# Patient Record
Sex: FEMALE | Race: WHITE | Employment: OTHER | URBAN - METROPOLITAN AREA
[De-identification: names, ages, dates, MRNs, and addresses within clinical notes are randomized per-mention and may not be internally consistent; named-entity substitution may affect disease eponyms.]

---

## 2017-06-12 ENCOUNTER — OFFICE VISIT (OUTPATIENT)
Dept: OBGYN | Age: 72
End: 2017-06-12

## 2017-06-12 VITALS
WEIGHT: 175 LBS | BODY MASS INDEX: 33.04 KG/M2 | HEIGHT: 61 IN | DIASTOLIC BLOOD PRESSURE: 64 MMHG | SYSTOLIC BLOOD PRESSURE: 122 MMHG

## 2017-06-12 DIAGNOSIS — Z11.51 ENCOUNTER FOR SCREENING FOR HUMAN PAPILLOMAVIRUS (HPV): ICD-10-CM

## 2017-06-12 DIAGNOSIS — Z12.31 SCREENING MAMMOGRAM, ENCOUNTER FOR: ICD-10-CM

## 2017-06-12 DIAGNOSIS — Z78.0 POSTMENOPAUSAL: ICD-10-CM

## 2017-06-12 DIAGNOSIS — Z01.419 WELL WOMAN EXAM WITH ROUTINE GYNECOLOGICAL EXAM: Primary | ICD-10-CM

## 2017-06-12 LAB — PAP SMEAR: NORMAL

## 2017-06-12 PROCEDURE — G0101 CA SCREEN;PELVIC/BREAST EXAM: HCPCS | Performed by: OBSTETRICS & GYNECOLOGY

## 2017-06-12 PROCEDURE — G8427 DOCREV CUR MEDS BY ELIG CLIN: HCPCS | Performed by: OBSTETRICS & GYNECOLOGY

## 2017-06-12 PROCEDURE — G8417 CALC BMI ABV UP PARAM F/U: HCPCS | Performed by: OBSTETRICS & GYNECOLOGY

## 2017-06-12 RX ORDER — ATORVASTATIN CALCIUM 10 MG/1
TABLET, FILM COATED ORAL
Refills: 1 | COMMUNITY
Start: 2017-04-02

## 2017-06-23 DIAGNOSIS — Z11.51 ENCOUNTER FOR SCREENING FOR HUMAN PAPILLOMAVIRUS (HPV): ICD-10-CM

## 2017-06-23 DIAGNOSIS — Z01.419 WELL WOMAN EXAM WITH ROUTINE GYNECOLOGICAL EXAM: ICD-10-CM

## 2017-12-06 ENCOUNTER — HOSPITAL ENCOUNTER (OUTPATIENT)
Dept: WOMENS IMAGING | Age: 72
Discharge: HOME | End: 2017-12-06
Payer: MEDICARE

## 2017-12-06 DIAGNOSIS — Z12.31 SCREENING MAMMOGRAM, ENCOUNTER FOR: ICD-10-CM

## 2017-12-06 DIAGNOSIS — Z78.0 POSTMENOPAUSAL: ICD-10-CM

## 2017-12-06 PROCEDURE — 77080 DXA BONE DENSITY AXIAL: CPT

## 2017-12-06 PROCEDURE — G0202 SCR MAMMO BI INCL CAD: HCPCS

## 2017-12-15 ENCOUNTER — TELEPHONE (OUTPATIENT)
Dept: OBGYN | Age: 72
End: 2017-12-15

## 2018-06-14 ENCOUNTER — OFFICE VISIT (OUTPATIENT)
Dept: OBGYN CLINIC | Age: 73
End: 2018-06-14
Payer: MEDICARE

## 2018-06-14 VITALS — BODY MASS INDEX: 33.23 KG/M2 | WEIGHT: 176 LBS | HEIGHT: 61 IN

## 2018-06-14 DIAGNOSIS — Z11.51 SCREENING FOR HPV (HUMAN PAPILLOMAVIRUS): ICD-10-CM

## 2018-06-14 DIAGNOSIS — Z01.419 WELL WOMAN EXAM WITH ROUTINE GYNECOLOGICAL EXAM: Primary | ICD-10-CM

## 2018-06-14 PROCEDURE — G8417 CALC BMI ABV UP PARAM F/U: HCPCS | Performed by: OBSTETRICS & GYNECOLOGY

## 2018-06-14 PROCEDURE — G8427 DOCREV CUR MEDS BY ELIG CLIN: HCPCS | Performed by: OBSTETRICS & GYNECOLOGY

## 2018-06-14 PROCEDURE — G0101 CA SCREEN;PELVIC/BREAST EXAM: HCPCS | Performed by: OBSTETRICS & GYNECOLOGY

## 2018-06-14 ASSESSMENT — ENCOUNTER SYMPTOMS
ABDOMINAL PAIN: 0
COLOR CHANGE: 0
CONSTIPATION: 0
COUGH: 0
BLOOD IN STOOL: 0
SINUS PRESSURE: 0
WHEEZING: 0
NAUSEA: 0
VOMITING: 0
SHORTNESS OF BREATH: 0

## 2018-06-21 DIAGNOSIS — Z01.419 WELL WOMAN EXAM WITH ROUTINE GYNECOLOGICAL EXAM: ICD-10-CM

## 2018-06-21 DIAGNOSIS — Z11.51 SCREENING FOR HPV (HUMAN PAPILLOMAVIRUS): ICD-10-CM

## 2018-11-26 ENCOUNTER — TELEPHONE (OUTPATIENT)
Dept: OBGYN CLINIC | Age: 73
End: 2018-11-26

## 2018-11-26 DIAGNOSIS — Z12.31 SCREENING MAMMOGRAM, ENCOUNTER FOR: Primary | ICD-10-CM

## 2018-12-07 ENCOUNTER — HOSPITAL ENCOUNTER (OUTPATIENT)
Dept: WOMENS IMAGING | Age: 73
Discharge: HOME | End: 2018-12-09
Payer: MEDICARE

## 2018-12-07 DIAGNOSIS — Z12.31 SCREENING MAMMOGRAM, ENCOUNTER FOR: ICD-10-CM

## 2018-12-07 PROCEDURE — 77067 SCR MAMMO BI INCL CAD: CPT

## 2019-02-12 PROBLEM — M50.21 HERNIATED NUCLEUS PULPOSUS, C3-4: Status: ACTIVE | Noted: 2019-02-12

## 2019-02-12 PROBLEM — G99.2 STENOSIS OF CERVICAL SPINE WITH MYELOPATHY (HCC): Status: ACTIVE | Noted: 2019-02-12

## 2019-02-12 PROBLEM — M48.02 STENOSIS OF CERVICAL SPINE WITH MYELOPATHY (HCC): Status: ACTIVE | Noted: 2019-02-12

## 2019-12-10 ENCOUNTER — TELEPHONE (OUTPATIENT)
Dept: OBGYN CLINIC | Age: 74
End: 2019-12-10

## 2019-12-10 DIAGNOSIS — Z12.31 SCREENING MAMMOGRAM, ENCOUNTER FOR: Primary | ICD-10-CM

## 2019-12-24 ENCOUNTER — HOSPITAL ENCOUNTER (OUTPATIENT)
Dept: WOMENS IMAGING | Age: 74
Discharge: HOME | End: 2019-12-26
Payer: MEDICARE

## 2019-12-24 DIAGNOSIS — Z12.31 SCREENING MAMMOGRAM, ENCOUNTER FOR: ICD-10-CM

## 2019-12-24 PROCEDURE — 77063 BREAST TOMOSYNTHESIS BI: CPT

## 2020-06-15 ENCOUNTER — OFFICE VISIT (OUTPATIENT)
Dept: OBGYN CLINIC | Age: 75
End: 2020-06-15
Payer: MEDICARE

## 2020-06-15 VITALS — SYSTOLIC BLOOD PRESSURE: 124 MMHG | BODY MASS INDEX: 32.74 KG/M2 | DIASTOLIC BLOOD PRESSURE: 72 MMHG | WEIGHT: 175 LBS

## 2020-06-15 PROCEDURE — G0101 CA SCREEN;PELVIC/BREAST EXAM: HCPCS | Performed by: OBSTETRICS & GYNECOLOGY

## 2020-06-15 RX ORDER — OXYBUTYNIN CHLORIDE 5 MG/1
TABLET, EXTENDED RELEASE ORAL
COMMUNITY
Start: 2020-05-19 | End: 2022-02-01

## 2020-06-15 ASSESSMENT — PATIENT HEALTH QUESTIONNAIRE - PHQ9
SUM OF ALL RESPONSES TO PHQ QUESTIONS 1-9: 0
SUM OF ALL RESPONSES TO PHQ QUESTIONS 1-9: 0
SUM OF ALL RESPONSES TO PHQ9 QUESTIONS 1 & 2: 0
1. LITTLE INTEREST OR PLEASURE IN DOING THINGS: 0
2. FEELING DOWN, DEPRESSED OR HOPELESS: 0

## 2020-06-15 ASSESSMENT — ENCOUNTER SYMPTOMS
VOMITING: 0
ABDOMINAL PAIN: 0
EYES NEGATIVE: 1
RESPIRATORY NEGATIVE: 1
ABDOMINAL DISTENTION: 0
RECTAL PAIN: 0
CONSTIPATION: 0
BLOOD IN STOOL: 0
NAUSEA: 0
DIARRHEA: 0
ALLERGIC/IMMUNOLOGIC NEGATIVE: 1
ANAL BLEEDING: 0

## 2020-06-15 NOTE — PROGRESS NOTES
Effort: Pulmonary effort is normal. No respiratory distress. Breath sounds: Normal breath sounds. No wheezing or rales. Chest:      Chest wall: No tenderness. Breasts:         Right: No mass, nipple discharge, skin change or tenderness. Left: No mass, nipple discharge, skin change or tenderness. Abdominal:      General: Bowel sounds are normal. There is no distension. Palpations: Abdomen is soft. There is no mass. Tenderness: There is no abdominal tenderness. There is no guarding or rebound. Hernia: There is no hernia in the right inguinal area or left inguinal area. Genitourinary:     Labia:         Right: No rash, tenderness, lesion or injury. Left: No rash, tenderness, lesion or injury. Vagina: Normal. No signs of injury and foreign body. No vaginal discharge, erythema, tenderness or bleeding. Cervix: No cervical motion tenderness, discharge or friability. Uterus: Not deviated, not enlarged, not fixed and not tender. Adnexa:         Right: No mass, tenderness or fullness. Left: No mass, tenderness or fullness. Rectum: Normal.   Musculoskeletal: Normal range of motion. General: No tenderness. Lymphadenopathy:      Cervical: No cervical adenopathy. Skin:     General: Skin is warm and dry. Coloration: Skin is not pale. Findings: No erythema or rash. Neurological:      Mental Status: She is alert and oriented to person, place, and time. Psychiatric:         Behavior: Behavior normal.         Thought Content: Thought content normal.         Judgment: Judgment normal.         Assessment:      Diagnosis Orders   1. Encounter for well woman exam with routine gynecological exam     2. Screening mammogram, encounter for  JUDE DIGITAL SCREEN W CAD BILATERAL   3. Osteoporosis screening  DEXA BONE DENSITY AXIAL SKELETON   4.  Postmenopausal  DEXA BONE DENSITY AXIAL SKELETON         Plan:      Medications placedthis

## 2021-01-13 ENCOUNTER — HOSPITAL ENCOUNTER (OUTPATIENT)
Dept: WOMENS IMAGING | Age: 76
Discharge: HOME | End: 2021-01-15
Payer: MEDICARE

## 2021-01-13 DIAGNOSIS — Z13.820 OSTEOPOROSIS SCREENING: ICD-10-CM

## 2021-01-13 DIAGNOSIS — Z12.31 SCREENING MAMMOGRAM, ENCOUNTER FOR: ICD-10-CM

## 2021-01-13 DIAGNOSIS — Z78.0 POSTMENOPAUSAL: ICD-10-CM

## 2021-01-13 PROCEDURE — 77080 DXA BONE DENSITY AXIAL: CPT

## 2021-01-13 PROCEDURE — 77067 SCR MAMMO BI INCL CAD: CPT

## 2022-01-03 ENCOUNTER — TELEPHONE (OUTPATIENT)
Dept: OBGYN CLINIC | Age: 77
End: 2022-01-03

## 2022-01-03 NOTE — TELEPHONE ENCOUNTER
Pt calling to get an order for her mammogram. Pt was last seen June 2020. She is scheduled for her annual on 2-1-22.  Please advise if annual is needed and if mammogram order can be placed before her annual.

## 2022-01-04 NOTE — TELEPHONE ENCOUNTER
Called pt and made aware to keep upcoming scheduled appointment and order to be placed at that time.

## 2022-02-01 ENCOUNTER — OFFICE VISIT (OUTPATIENT)
Dept: OBGYN CLINIC | Age: 77
End: 2022-02-01
Payer: MEDICARE

## 2022-02-01 VITALS
HEIGHT: 62 IN | WEIGHT: 184 LBS | SYSTOLIC BLOOD PRESSURE: 128 MMHG | DIASTOLIC BLOOD PRESSURE: 74 MMHG | BODY MASS INDEX: 33.86 KG/M2

## 2022-02-01 DIAGNOSIS — Z01.419 ENCOUNTER FOR WELL WOMAN EXAM WITH ROUTINE GYNECOLOGICAL EXAM: Primary | ICD-10-CM

## 2022-02-01 DIAGNOSIS — Z12.31 SCREENING MAMMOGRAM FOR BREAST CANCER: ICD-10-CM

## 2022-02-01 PROCEDURE — G8484 FLU IMMUNIZE NO ADMIN: HCPCS | Performed by: OBSTETRICS & GYNECOLOGY

## 2022-02-01 PROCEDURE — G0101 CA SCREEN;PELVIC/BREAST EXAM: HCPCS | Performed by: OBSTETRICS & GYNECOLOGY

## 2022-02-01 ASSESSMENT — ENCOUNTER SYMPTOMS
NAUSEA: 0
RESPIRATORY NEGATIVE: 1
EYES NEGATIVE: 1
BLOOD IN STOOL: 0
DIARRHEA: 0
ABDOMINAL PAIN: 0
CONSTIPATION: 0
VOMITING: 0
ALLERGIC/IMMUNOLOGIC NEGATIVE: 1
RECTAL PAIN: 0
ANAL BLEEDING: 0
ABDOMINAL DISTENTION: 0

## 2022-02-01 ASSESSMENT — PATIENT HEALTH QUESTIONNAIRE - PHQ9
SUM OF ALL RESPONSES TO PHQ QUESTIONS 1-9: 0
2. FEELING DOWN, DEPRESSED OR HOPELESS: 0
SUM OF ALL RESPONSES TO PHQ QUESTIONS 1-9: 0
SUM OF ALL RESPONSES TO PHQ9 QUESTIONS 1 & 2: 0
1. LITTLE INTEREST OR PLEASURE IN DOING THINGS: 0

## 2022-02-01 ASSESSMENT — VISUAL ACUITY: OU: 1

## 2022-02-01 NOTE — PROGRESS NOTES
Subjective:      Patient ID: Jose A Brito is a 78 y.o. female    Annual exam.  No PMB. No GYN complaints. Pap deferred. Screening mammogram ordered. Monthly SBE encouraged. Dexa scan ordered per protocol. Screening colonoscopy recommended per routine  . F/U annual exam or prn. Vitals:  /74   Ht 5' 1.5\" (1.562 m)   Wt 184 lb (83.5 kg)   LMP  (LMP Unknown)   BMI 34.20 kg/m²   Past Medical History:   Diagnosis Date    Chronic back pain      Past Surgical History:   Procedure Laterality Date    BACK SURGERY      x2    CARPAL TUNNEL RELEASE      B/L wrist    CYST REMOVAL      back    LOBECTOMY, THYROID Right      Allergies:  Patient has no known allergies. Current Outpatient Medications   Medication Sig Dispense Refill    atorvastatin (LIPITOR) 10 MG tablet TAKE 1 TABLET AT BEDTIME  1     No current facility-administered medications for this visit. Social History     Socioeconomic History    Marital status: Single     Spouse name: Not on file    Number of children: Not on file    Years of education: Not on file    Highest education level: Not on file   Occupational History    Not on file   Tobacco Use    Smoking status: Former Smoker     Quit date: 2019     Years since quittin.9    Smokeless tobacco: Never Used   Substance and Sexual Activity    Alcohol use: Yes     Comment: occ    Drug use: No    Sexual activity: Not Currently   Other Topics Concern    Not on file   Social History Narrative    Not on file     Social Determinants of Health     Financial Resource Strain:     Difficulty of Paying Living Expenses: Not on file   Food Insecurity:     Worried About Running Out of Food in the Last Year: Not on file    Emily of Food in the Last Year: Not on file   Transportation Needs:     Lack of Transportation (Medical): Not on file    Lack of Transportation (Non-Medical):  Not on file   Physical Activity:     Days of Exercise per Week: Not on file    Minutes of Exercise per Session: Not on file   Stress:     Feeling of Stress : Not on file   Social Connections:     Frequency of Communication with Friends and Family: Not on file    Frequency of Social Gatherings with Friends and Family: Not on file    Attends Yarsani Services: Not on file    Active Member of Clubs or Organizations: Not on file    Attends Club or Organization Meetings: Not on file    Marital Status: Not on file   Intimate Partner Violence:     Fear of Current or Ex-Partner: Not on file    Emotionally Abused: Not on file    Physically Abused: Not on file    Sexually Abused: Not on file   Housing Stability:     Unable to Pay for Housing in the Last Year: Not on file    Number of [place] in the Last Year: Not on file    Unstable Housing in the Last Year: Not on file     Family History   Problem Relation Age of Onset    High Blood Pressure Mother     Stroke Mother     Breast Cancer Neg Hx     Cancer Neg Hx     Diabetes Neg Hx     Eclampsia Neg Hx     Colon Cancer Neg Hx     Hypertension Neg Hx     Ovarian Cancer Neg Hx      Labor Neg Hx     Spont Abortions Neg Hx        Review of Systems   Constitutional: Negative. Negative for activity change, appetite change, chills, diaphoresis, fatigue, fever and unexpected weight change. HENT: Negative. Eyes: Negative. Respiratory: Negative. Cardiovascular: Negative. Gastrointestinal: Negative for abdominal distention, abdominal pain, anal bleeding, blood in stool, constipation, diarrhea, nausea, rectal pain and vomiting. Endocrine: Negative. Genitourinary: Negative for decreased urine volume, difficulty urinating, dyspareunia, dysuria, enuresis, flank pain, frequency, genital sores, hematuria, menstrual problem, pelvic pain, urgency, vaginal bleeding, vaginal discharge and vaginal pain. Musculoskeletal: Negative. Skin: Negative. Allergic/Immunologic: Negative. Neurological: Negative.     Hematological: Negative. Psychiatric/Behavioral: Negative. Objective:     Physical Exam  Constitutional:       Appearance: She is well-developed. HENT:      Head: Normocephalic. Eyes:      General: Lids are normal. Vision grossly intact. Neck:      Thyroid: No thyromegaly. Cardiovascular:      Rate and Rhythm: Normal rate and regular rhythm. Heart sounds: Normal heart sounds. Pulmonary:      Effort: Pulmonary effort is normal. No respiratory distress. Breath sounds: Normal breath sounds. No wheezing or rales. Chest:      Chest wall: No tenderness. Breasts:      Right: Normal. No swelling, bleeding, inverted nipple, mass, nipple discharge, skin change, tenderness, axillary adenopathy or supraclavicular adenopathy. Left: Normal. No swelling, bleeding, inverted nipple, mass, nipple discharge, skin change, tenderness, axillary adenopathy or supraclavicular adenopathy. Abdominal:      General: There is no distension. Palpations: Abdomen is soft. There is no mass. Tenderness: There is no abdominal tenderness. There is no guarding or rebound. Hernia: No hernia is present. There is no hernia in the left inguinal area or right inguinal area. Genitourinary:     General: Normal vulva. Pubic Area: No rash. Labia:         Right: No rash, tenderness, lesion or injury. Left: No rash, tenderness, lesion or injury. Urethra: No prolapse, urethral swelling or urethral lesion. Vagina: Normal. No signs of injury and foreign body. No vaginal discharge, erythema, tenderness or bleeding. Cervix: No cervical motion tenderness, discharge or friability. Uterus: Not deviated, not enlarged, not fixed and not tender. Adnexa:         Right: No mass, tenderness or fullness. Left: No mass, tenderness or fullness. Rectum: Normal.   Musculoskeletal:         General: No tenderness. Normal range of motion.       Cervical back: Normal range of motion and neck supple. Lymphadenopathy:      Cervical: No cervical adenopathy. Upper Body:      Right upper body: No supraclavicular or axillary adenopathy. Left upper body: No supraclavicular or axillary adenopathy. Lower Body: No right inguinal adenopathy. No left inguinal adenopathy. Skin:     General: Skin is warm and dry. Coloration: Skin is not pale. Findings: No erythema or rash. Neurological:      Mental Status: She is alert and oriented to person, place, and time. Psychiatric:         Behavior: Behavior normal.         Thought Content: Thought content normal.         Judgment: Judgment normal.         Assessment:      Diagnosis Orders   1. Encounter for well woman exam with routine gynecological exam     2. Screening mammogram for breast cancer  JUDE DIGITAL SCREEN W OR WO CAD BILATERAL         Plan:      Medications placedthis encounter:  No orders of the defined types were placed in this encounter. Orders placedthis encounter:  Orders Placed This Encounter   Procedures    JUDE DIGITAL SCREEN W OR WO CAD BILATERAL     Standing Status:   Future     Standing Expiration Date:   2/1/2023         Follow up:  Return for Annual, PRN. Repeat Annual GYN exam every 1 year. Cervical Cytology exam starts age 24. If Negative Cytology;  follow-up screening per current guidelines. Mammograms yearly starting at age 36. Calcium and Vitamin D dosing reviewed ( age appropriate ). Colonoscopy and bone density screening discussed ( age appropriate ). Birth control and STD prevention discussed ( age appropriate ). Gardisil counseling completed for all patients ( age appropriate ). Routine health maintenance ( per PCP and guidelines ).

## 2022-02-14 ENCOUNTER — HOSPITAL ENCOUNTER (OUTPATIENT)
Dept: WOMENS IMAGING | Age: 77
Discharge: HOME | End: 2022-02-16
Payer: MEDICARE

## 2022-02-14 VITALS — BODY MASS INDEX: 34.2 KG/M2 | HEIGHT: 62 IN

## 2022-02-14 DIAGNOSIS — Z12.31 SCREENING MAMMOGRAM FOR BREAST CANCER: ICD-10-CM

## 2022-02-14 PROCEDURE — 77063 BREAST TOMOSYNTHESIS BI: CPT

## 2023-02-17 ENCOUNTER — HOSPITAL ENCOUNTER (OUTPATIENT)
Dept: WOMENS IMAGING | Age: 78
End: 2023-02-17
Payer: MEDICARE

## 2023-02-17 VITALS — WEIGHT: 182 LBS | HEIGHT: 62 IN | BODY MASS INDEX: 33.49 KG/M2

## 2023-02-17 DIAGNOSIS — Z78.0 MENOPAUSE: ICD-10-CM

## 2023-02-17 DIAGNOSIS — Z12.31 ENCOUNTER FOR MAMMOGRAM TO ESTABLISH BASELINE MAMMOGRAM: ICD-10-CM

## 2023-02-17 DIAGNOSIS — Z78.0 POST-MENOPAUSAL: ICD-10-CM

## 2023-02-17 PROCEDURE — 77067 SCR MAMMO BI INCL CAD: CPT

## 2023-02-17 PROCEDURE — 77080 DXA BONE DENSITY AXIAL: CPT

## 2023-03-22 LAB
THYROTROPIN (MIU/L) IN SER/PLAS BY DETECTION LIMIT <= 0.05 MIU/L: 2.48 MIU/L (ref 0.44–3.98)
THYROXINE (T4) FREE (NG/DL) IN SER/PLAS: 1.27 NG/DL (ref 0.61–1.12)

## 2023-07-26 ENCOUNTER — HOSPITAL ENCOUNTER (OUTPATIENT)
Dept: DATA CONVERSION | Facility: HOSPITAL | Age: 78
End: 2023-07-27
Attending: ORTHOPAEDIC SURGERY | Admitting: ORTHOPAEDIC SURGERY
Payer: MEDICARE

## 2023-07-26 DIAGNOSIS — M25.761 OSTEOPHYTE, RIGHT KNEE: ICD-10-CM

## 2023-07-26 DIAGNOSIS — Z87.891 PERSONAL HISTORY OF NICOTINE DEPENDENCE: ICD-10-CM

## 2023-07-26 DIAGNOSIS — E66.9 OBESITY, UNSPECIFIED: ICD-10-CM

## 2023-07-26 DIAGNOSIS — D62 ACUTE POSTHEMORRHAGIC ANEMIA: ICD-10-CM

## 2023-07-26 DIAGNOSIS — M17.11 UNILATERAL PRIMARY OSTEOARTHRITIS, RIGHT KNEE: ICD-10-CM

## 2023-07-26 LAB
ABO GROUP (TYPE) IN BLOOD: NORMAL
ABO GROUP (TYPE) IN BLOOD: NORMAL
ANTIBODY SCREEN: NORMAL
RH FACTOR: NORMAL
RH FACTOR: NORMAL

## 2023-07-27 LAB
ANION GAP IN SER/PLAS: 12 MMOL/L (ref 10–20)
ANION GAP SERPL CALCULATED.3IONS-SCNC: 12 MMOL/L (ref 10–20)
BASOPHILS # BLD: 0.01 X10E9/L (ref 0–0.1)
BASOPHILS (10*3/UL) IN BLOOD BY AUTOMATED COUNT: 0.01 X10E9/L (ref 0–0.1)
BASOPHILS RELATIVE PERCENT: 0.1 % (ref 0–2)
BASOPHILS/100 LEUKOCYTES IN BLOOD BY AUTOMATED COUNT: 0.1 % (ref 0–2)
BICARBONATE: 24 MMOL/L (ref 21–32)
CALCIUM (MG/DL) IN SER/PLAS: 8.2 MG/DL (ref 8.6–10.3)
CALCIUM SERPL-MCNC: 8.2 MG/DL (ref 8.6–10.3)
CARBON DIOXIDE, TOTAL (MMOL/L) IN SER/PLAS: 24 MMOL/L (ref 21–32)
CHLORIDE (MMOL/L) IN SER/PLAS: 100 MMOL/L (ref 98–107)
CHLORIDE BLD-SCNC: 100 MMOL/L (ref 98–107)
CREAT SERPL-MCNC: 1.05 MG/DL (ref 0.5–1.05)
CREATININE (MG/DL) IN SER/PLAS: 1.05 MG/DL (ref 0.5–1.05)
EGFR FEMALE: 55 ML/MIN/1.73M2
ERYTHROCYTE DISTRIBUTION WIDTH (RATIO) BY AUTOMATED COUNT: 13 % (ref 11.5–14.5)
ERYTHROCYTE MEAN CORPUSCULAR HEMOGLOBIN CONCENTRATION (G/DL) BY AUTOMATED: 33.6 G/DL (ref 32–36)
ERYTHROCYTE MEAN CORPUSCULAR VOLUME (FL) BY AUTOMATED COUNT: 89 FL (ref 80–100)
ERYTHROCYTE [DISTWIDTH] IN BLOOD BY AUTOMATED COUNT: 13 % (ref 11.5–14.5)
ERYTHROCYTES (10*6/UL) IN BLOOD BY AUTOMATED COUNT: 2.98 X10E12/L (ref 4–5.2)
GFR FEMALE: 55 ML/MIN/1.73M2
GLUCOSE (MG/DL) IN SER/PLAS: 146 MG/DL (ref 74–99)
GLUCOSE: 146 MG/DL (ref 74–99)
HCT VFR BLD CALC: 26.5 % (ref 36–46)
HEMATOCRIT (%) IN BLOOD BY AUTOMATED COUNT: 26.5 % (ref 36–46)
HEMOGLOBIN (G/DL) IN BLOOD: 8.9 G/DL (ref 12–16)
HEMOGLOBIN: 8.9 G/DL (ref 12–16)
IMMATURE GRANULOCYTES %: 0.4 % (ref 0–0.9)
IMMATURE GRANULOCYTES/100 LEUKOCYTES IN BLOOD BY AUTOMATED COUNT: 0.4 % (ref 0–0.9)
LEUKOCYTES (10*3/UL) IN BLOOD BY AUTOMATED COUNT: 9.8 X10E9/L (ref 4.4–11.3)
LYMPHOCYTES # BLD: 8.5 % (ref 13–44)
LYMPHOCYTES (10*3/UL) IN BLOOD BY AUTOMATED COUNT: 0.83 X10E9/L (ref 0.8–3)
LYMPHOCYTES RELATIVE PERCENT: 0.83 X10E9/L (ref 0.8–3)
LYMPHOCYTES/100 LEUKOCYTES IN BLOOD BY AUTOMATED COUNT: 8.5 % (ref 13–44)
MCHC RBC AUTO-ENTMCNC: 33.6 G/DL (ref 32–36)
MCV RBC AUTO: 89 FL (ref 80–100)
MONOCYTES # BLD: 0.53 X10E9/L (ref 0.05–0.8)
MONOCYTES (10*3/UL) IN BLOOD BY AUTOMATED COUNT: 0.53 X10E9/L (ref 0.05–0.8)
MONOCYTES RELATIVE PERCENT: 5.4 % (ref 2–10)
MONOCYTES/100 LEUKOCYTES IN BLOOD BY AUTOMATED COUNT: 5.4 % (ref 2–10)
NEUTROPHILS (10*3/UL) IN BLOOD BY AUTOMATED COUNT: 8.36 X10E9/L (ref 1.6–5.5)
NEUTROPHILS RELATIVE PERCENT: 85.6 % (ref 40–80)
NEUTROPHILS/100 LEUKOCYTES IN BLOOD BY AUTOMATED COUNT: 85.6 % (ref 40–80)
NEUTROPHILS: 8.36 X10E9/L (ref 1.6–5.5)
PLATELET # BLD: 221 X10E9/L (ref 150–450)
PLATELETS (10*3/UL) IN BLOOD AUTOMATED COUNT: 221 X10E9/L (ref 150–450)
POTASSIUM (MMOL/L) IN SER/PLAS: 4.1 MMOL/L (ref 3.5–5.3)
POTASSIUM SERPL-SCNC: 4.1 MMOL/L (ref 3.5–5.3)
RBC # BLD: 2.98 X10E12/L (ref 4–5.2)
SODIUM (MMOL/L) IN SER/PLAS: 132 MMOL/L (ref 136–145)
SODIUM BLD-SCNC: 132 MMOL/L (ref 136–145)
UREA NITROGEN (MG/DL) IN SER/PLAS: 21 MG/DL (ref 6–23)
UREA NITROGEN: 21 MG/DL (ref 6–23)
WBC: 9.8 X10E9/L (ref 4.4–11.3)

## 2023-07-28 LAB
ANION GAP IN SER/PLAS: NORMAL
BASOPHILS (10*3/UL) IN BLOOD BY AUTOMATED COUNT: NORMAL
BASOPHILS/100 LEUKOCYTES IN BLOOD BY AUTOMATED COUNT: NORMAL
CALCIUM (MG/DL) IN SER/PLAS: NORMAL
CARBON DIOXIDE, TOTAL (MMOL/L) IN SER/PLAS: NORMAL
CHLORIDE (MMOL/L) IN SER/PLAS: NORMAL
CREATININE (MG/DL) IN SER/PLAS: NORMAL
EOSINOPHILS (10*3/UL) IN BLOOD BY AUTOMATED COUNT: NORMAL
EOSINOPHILS/100 LEUKOCYTES IN BLOOD BY AUTOMATED COUNT: NORMAL
ERYTHROCYTE DISTRIBUTION WIDTH (RATIO) BY AUTOMATED COUNT: NORMAL
ERYTHROCYTE MEAN CORPUSCULAR HEMOGLOBIN CONCENTRATION (G/DL) BY AUTOMATED: NORMAL
ERYTHROCYTE MEAN CORPUSCULAR VOLUME (FL) BY AUTOMATED COUNT: NORMAL
ERYTHROCYTES (10*6/UL) IN BLOOD BY AUTOMATED COUNT: NORMAL
GFR FEMALE: NORMAL
GFR MALE: NORMAL
GLUCOSE (MG/DL) IN SER/PLAS: NORMAL
HEMATOCRIT (%) IN BLOOD BY AUTOMATED COUNT: NORMAL
HEMOGLOBIN (G/DL) IN BLOOD: NORMAL
IMMATURE GRANULOCYTES/100 LEUKOCYTES IN BLOOD BY AUTOMATED COUNT: NORMAL
LEUKOCYTES (10*3/UL) IN BLOOD BY AUTOMATED COUNT: NORMAL
LYMPHOCYTES (10*3/UL) IN BLOOD BY AUTOMATED COUNT: NORMAL
LYMPHOCYTES/100 LEUKOCYTES IN BLOOD BY AUTOMATED COUNT: NORMAL
MANUAL DIFFERENTIAL Y/N: NORMAL
MONOCYTES (10*3/UL) IN BLOOD BY AUTOMATED COUNT: NORMAL
MONOCYTES/100 LEUKOCYTES IN BLOOD BY AUTOMATED COUNT: NORMAL
NEUTROPHILS (10*3/UL) IN BLOOD BY AUTOMATED COUNT: NORMAL
NEUTROPHILS/100 LEUKOCYTES IN BLOOD BY AUTOMATED COUNT: NORMAL
NRBC (PER 100 WBCS) BY AUTOMATED COUNT: NORMAL
PLATELETS (10*3/UL) IN BLOOD AUTOMATED COUNT: NORMAL
POTASSIUM (MMOL/L) IN SER/PLAS: NORMAL
SODIUM (MMOL/L) IN SER/PLAS: NORMAL
UREA NITROGEN (MG/DL) IN SER/PLAS: NORMAL

## 2023-09-30 NOTE — DISCHARGE SUMMARY
Send Summary:   Discharge Summary Providers:  Provider Role Provider Name   · Referring Nadira Contreras   · Attending David Ramon   · Consulting Jerson Pate   · Primary Nadira Contreras       Note Recipients: Jerson Pate MD Vicario, Deborah, MD - [phone] [preferred]       Discharge:    Summary:   Admission Date: .26-Jul-2023 09:26:00   Discharge Date: 27-Jul-2023   Attending Physician at Discharge: David Ramon   Admission Reason: s/p total knee   Final Discharge Diagnoses: Status post total knee  replacement   Procedures: Date: 26-Jul-2023 14:49:00  Procedure Name: 1.  Right knee robotically assisted total knee replacement  2.   3.   4.   5.   Condition at Discharge: Satisfactory   Disposition at Discharge: .Home   Vital Signs:        T   P  R  BP   MAP  SpO2   Value  36.8  55  16  140/65   93  93%  Date/Time 7/27 7:44 7/27 7:44 7/27 7:44 7/27 7:44  7/27 7:44 7/27 7:44  Range  (36.1C - 36.8C )  (55 - 71 )  (16 - 16 )  (127 - 140 )/ (61 - 65 )  (90 - 93 )  (93% - 94% )    Date:            Weight/Scale Type:  Height:   26-Jul-2023 09:45  80.4  kg / standing       Hospital Course:                                                  Discharge summary  This patient  was admitted to the hospital on 7/26/23 after undergoing tka without complications that morning.  During the postoperative period, while in hospital, patient was medically managed by the hospitalist. Please see medial notes and H&P for patients additional diagnoses.  Ortho agrees with all medical diagnoses and treatments while patient in hospital.  No significant or unexpected findings or abnormal ortho imaging were noted during the hospital stay  Hospital course  Patient tolerated surgical procedure well and there was no complications. Patient progressed adequately through their recovery during hospital stay including PT and rehabilitation.  DVT prophylaxis was implemented POD#1  Patient was then D/C to home} in stable condition.    Patient was  instructed on the use of pain medications, the signs and symptoms of infection, and was given our number to call should they have any questions or concerns following discharge.          Discharge Information:    and Continuing Care:   Lab Results - Pending:    None  Radiology Results - Pending: None   Discharge Instructions:    Activity:           activity as tolerated.          May shower..            May not return to school/work.            May not drive.            Weight-bearing Instructions: full weight bearing.  per total knee    Nutrition/Diet:           resume normal diet    Wound Care:           Wound Site:   right knee          Change Dressin time   peel off rubber dressing on 23    Additional Orders:           Additional Instructions:                                                        Total Knee Replacement                                                                                                    Discharge Instructions    To prevent Clot formation, you have been placed on the following medication:    anticoagulation                                              Surgical Site Care:    Change dressing once a day and PRN (as needed).     Apply 4 x 4 sponge and light tape.     If glue present, leave open to air.    You may leave wound open to air after initial dressing removal, if wound is clean, dry and intact     Aquacel Ag dressing is present, do not remove dressing for 7 days, unless heavily saturated.    If heavily saturated, remove dressing and start using instructions above     Staples will be removed on post-operative day 14 and steri-strips applied Showering is permitted starting post op day 1 if waterproof Aquacel dressing is present or when the incision is covered with 4 x 4 and Tegaderm waterproof dressing     Until all areas of incision are healed.                                                 Physical Therapy:        Weight Bearing Status:  Weight bear as  tolerated     total knee- protocol                                                                                                                            Pain Medications        You were given narcotics     Wean off pain medications as you deem appropriate as long as pain is under control Cold packs/Ice packs/Machine May be used every hour for 15-30 minutes as tolerated Be sure to have a barrier (cloth, clothing, towel) between the site an the ice pack to  prevent frostbite.    incentive spirometer 10 x every hour while awake for 2 weeks     surgical teds x3 weeks- removing only for skin care and hygiene     IF INCISION STARTS TO DRAIN CALL OFFICE RIGHT A WAY    Contact [facility] office if    Increased redness, swelling, drainage of any kind, and/or pain to surgery site. As well as new onset fevers and or chills. These could signify an infection. Calf or thigh tenderness to touch as well as increased swelling or redness. This could signify  a clot formation. Numbness or tingling to an area around the incision site or below the incision site (toes).Any rash appears, increased or new onset nausea/vomiting occur. This may indicate a reaction to a medication.    Phone # [phone].                                    Follow up with Surgeon in 2 weeks or as scheduled by the office     Home Care Certification:           Home Care Agency:    [facility] [phone]          Skilled Disciplines Ordered:   PT,  OT    Home Care Services:           Home Care Skilled Service:   Rehab (PT/OT/SP eval and treat),  wound care    Follow Up Appointments:    Follow-Up Appointment 01:           Physician/Dept/Service:   Dr JOSÉ MIGUEL Ramon          Reason for Referral:   right knee- post op          Call to Schedule in:   2 weeks,  or as already scheduled by the office          Location:   [address] [facility]          Phone Number:   [phone]    Discharge Medications: Home Medication    atorvastatin 10 mg oral tablet - 1 tab(s) orally once a day (at bedtime)  levothyroxine 50 mcg (0.05 mg) oral tablet - 1 tab(s) orally once a day  multivitamin - 1 tab(s) orally once a day  oxyCODONE 5 mg oral tablet - 1 tab(s) orally every 4 hours, As needed, Pain - 7-10    and take 0.5 tabs (2.5mg) for pain 4-6  aspirin 81 mg oral delayed release tablet - 1 tab(s) orally 2 times a day  docusate sodium 100 mg oral capsule - 1 cap(s) orally 2 times a day  pantoprazole 40 mg oral delayed release tablet - 1 tab(s) orally once a day     PRN Medication   cyclobenzaprine 10 mg oral tablet - 1 tab(s) orally 3 times a day, As needed, Muscle Spasms. if a whole pill makes you too tired- please try half  Aleve 220 mg oral tablet - 1 tab(s) orally every 12 hours, As Needed- holding for procedure   Claritin 10 mg oral tablet - 1 tab(s) orally once a day, As Needed     DNR Status:   ·  Code Status Code Status order at time of discharge: Full Code       Electronic Signatures:  Dotty Bermudez (APRN-CNP)  (Signed 27-Jul-2023 10:26)   Authored: Send Summary, Summary Content, Ongoing Care,  DNR Status, Note Completion      Last Updated: 27-Jul-2023 10:26 by Dotty Bermudez (APRN-CNP)

## 2023-09-30 NOTE — PROGRESS NOTES
Service: Orthopaedics     Subjective Data:   CHRISTOPHER ESCOTO is a 78 year old Female who is Hospital Day # 2 and POD #1 for 1.  Right knee robotically assisted total knee replacement;2. ;3. ;4. ;5.    Overnight Events: Patient had an uneventful night.     Objective Data:     Objective Information:      T   P  R  BP   MAP  SpO2   Value  36.8  55  16  140/65   93  93%  Date/Time 7/27 7:44 7/27 7:44 7/27 7:44 7/27 7:44 7/27 7:44 7/27 7:44  Range  (36.1C - 36.8C )  (55 - 71 )  (16 - 16 )  (127 - 140 )/ (61 - 65 )  (90 - 93 )  (93% - 94% )      Pain reported at 7/27 8:16: 5 = Moderate      T   P  R  BP   MAP  SpO2   Value  36.8  55  16  140/65   93  93%  Date/Time 7/27 7:44 7/27 7:44 7/27 7:44 7/27 7:44  7/27 7:44 7/27 7:44  Range  (36.1C - 36.8C )  (55 - 71 )  (16 - 16 )  (127 - 140 )/ (61 - 65 )  (90 - 93 )  (93% - 94% )        Pain reported at 7/27 8:16: 5 = Moderate         Weights   7/26 9:45: Weight in kg (Weight (kg))  80.4  7/26 9:45: Weight in lbs ((lbs))  177.2    Medication:    Medications:          Continuous Medications       --------------------------------    1. Lactated Ringers Infusion:  1000  mL  IntraVenous  <Continuous>    2. Lactated Ringers Infusion:  1000  mL  IntraVenous  <Continuous>         Scheduled Medications       --------------------------------    1. Acetaminophen:  650  mg  Oral  Every 6 Hours    2. Aspirin Enteric Coated:  81  mg  Oral  2 Times a Day    3. Atorvastatin:  10  mg  Oral  Every Night    4. Docusate:  100  mg  Oral  2 Times a Day    5. Ketorolac Injectable:  7.5  mg  IntraVenous Push  Every 6 Hours    6. Levothyroxine:  50  microgram(s)  Oral  Daily    7. Pantoprazole:  40  mg  Oral  Daily    8. Polyethylene Glycol:  17  gram(s)  Oral  2 Times a Day         PRN Medications       --------------------------------    1. Cyclobenzaprine:  10  mg  Oral  3 Times a Day    2. diphenhydrAMINE Injectable:  12.5  mg  IntraVenous Push  Every 6 Hours    3. Magnesium Hydroxide -Al  Hydrox -Simethicone Oral Liquid:  30  mL  Oral  Every 6 Hours    4. Ondansetron Injectable:  4  mg  IntraVenous Push  Every 6 Hours    5. oxyCODONE Immediate Release:  2.5  mg  Oral  Every 4 Hours    6. oxyCODONE Immediate Release:  5  mg  Oral  Every 4 Hours    7. oxyCODONE Immediate Release:  7.5  mg  Oral  Every 4 Hours        Recent Lab Results:    Results:    CBC: 7/27/2023 05:18              \     Hgb     /                              \     8.9 L    /  WBC  ----------------  Plt               9.8       ----------------    221              /     Hct     \                              /     26.5 L    \            RBC: 2.98 L    MCV: 89     Neutrophil %: 85.6      BMP: 7/27/2023 05:18  NA+        Cl-     BUN  /                         132 L   100    21  /  --------------------------------  Glucose                ---------------------------  146 H    K+     HCO3-   Creat \                         4.1  24    1.05  \  Calcium : 8.2 L    Anion Gap : 12        I have reviewed these laboratory results: Complete Blood Count + Differential [Drawn 27-Jul-2023 05:18:00], Basic Metabolic Panel [Drawn 27-Jul-2023 05:18:00].    Assessment and Plan:        Admitting Dx:   Status post total knee replacement: Entered Date: 26-Jul-2023  15:00    Comorbidities:  ·  Comorbidity anemia   ·  Anemia acute blood loss anemia, surgery related as well as partly dilutional     Code Status:  ·  Code Status Full Code     Advance Care Planning:  Advance Care Planning: I evaluated the patient  and determined the patient's capacity to understand the risks, benefits and alternatives to treatment. I elicited the patient's goals for treatment and reviewed advance directives and medical orders for life sustaining treatment. The patient was given  an opportunity to review a blank advance directive as appropriate.     Assessment:    Assessment    pt is doing really good.  able to move around, has been able to ambulate and use the restroom,  pain is well controlled, denies any nausea vomiting, SOB or chest pain. doing. pt's incision is clean dry and intact and area is soft. pt is doing  good and  states he is ready to get going. she is going to stay with her sister for bit. pt does have some acute blood loss anemia- which is also partly dilutional    PLAN   PT/ OT and continued mobility   Home with [facility]   has a walker   DVT prophylaxis    pain meds      Electronic Signatures:  Dotty Bermudez (APRN-CNP)   (Signed 27-Jul-2023 08:48)   Authored: Service, Assessment/Plan Review, Subjective Data, Objective Data, Assessment and Plan, Note Completion  Michel Ferrari)   (Signed 27-Jul-2023 13:46)   Co-Signer: Service, Assessment/Plan Review, Subjective Data, Objective Data, Assessment and Plan, Note Completion    Last Updated: 27-Jul-2023 13:46 by Michel Ferrari)

## 2023-10-02 NOTE — OP NOTE
Post Operative Note:     Post-Procedure Diagnosis: End-stage right knee arthritis   Procedure: 1.  Right knee robotically assisted total  knee replacement  2.   3.   4.   5.   Surgeon: David Ramon MD   Resident/Fellow/Other Assistant: David Soni PA-C   Estimated Blood Loss (mL): 100   Specimen: no   Findings: Grade 4 tricompartmental arthritis     Operative Report Dictated:  Dictation: not applicable - note contains Operative  Report   Note Recipients: Nadira Contreras MD - [id number]  [preferred]   Operative Report:    Implants Ellijay triathlon CS  Femoral component size 4  Tibial component size 3  Tibial insert poly-9 mm    Patella component 29 mm /8 mm poly       Indications: 78-year-old female with persistent knee pain despite conservative treatment elected proceed with surgery for right knee robotically assisted total knee replacement     The patient has pain in the knee that is increased with activity and weightbearing, and walks with an antalgic gait.  The pain is interfering with activities of daily living.  The patient has limited range of motion and pain with passive range of motion.   X-rays demonstrate joint space narrowing, subchondral sclerosis and osteophyte formation.  Symptoms are not improving with medication, therapy or supportive device for a period of at least 3 months. Patient has had progressive knee pain which has been  refractory to conservative treatment.    The patient has decided to undergo elective knee replacement using the SAVANA/robotically assisted knee replacement system.      The risks benefits outcomes and postoperative course were fully explained to the patient.  Risks benefits and alternatives to surgery were discussed including but not limited to Infection, bleeding, neurovascular injury, pain and dysfunction, hardware  related complications including cutout failure breakage, loss of function, motion, and permanent disability as well as the cardiovascular and  pulmonary complications from anesthesia including death and DVT.   Patient and family accept these risks.  We discussed specifically loosening, infection, blood clot, incomplete pain relief and the need for revision arthroplasty, possible intraoperative decision for total knee replacement, loss of life loss of limb, nerve damage, numbness, failure of the procedure,  stiffness, progressive arthritis and the potential need for revision knee surgery.  The patient understands the procedure and its risks and consents to the surgical intervention.     Patient identified in the preop area.  Right lower extremity marked and confirmed with patient as the operative site.  Brought back to the operating room and anesthetized under anesthesia.  Right lower extremity was then prepped and draped in standard  sterile fashion.  Patient, site and procedure were confirmed with timeout.  Everyone in the room agreed.  Preop antibiotics were given. Patient was placed under a spinal as well as a regional nerve block.     The patient was brought to the operating room and placed on the surgical table in supine position where upon adequate anesthesia was then obtained.  Surgical timeout was then performed.  This history and physical has been reviewed and updated in signed  the patient had received appropriate antibiotics and the surgical site was marked prior to entering the operating room.  The leg was prepped and draped in the usual sterile manner.    We then made a direct midline incision through the skin, subcutaneous fat, and peritenon.  We then proceeded with a medial parapatellar approach incising through the medial border the patellar tendon and quadriceps tendon.  We incised the medial meniscus  and removed some of the fat pad for exposure.  We then subluxed the patella and inspected the knee joint.  Cartilage disease pattern: Tricompartmental arthritis grade 4 medial and patellofemoral grade 3 lateral compartment.    Given the  diffuse cartilage disease in all 3 compartments we then proceeded with a total knee replacement.  We then placed our femoral and tibial pins with array.  Placed femoral and tibial divots for our robotically assisted planning and templating.  Once this was complete land marking was performed and accuracy was assessed in both the tibia and the femur.  After confirming accuracy femoral tracking was assessed as was the soft tissue tension and motion usually using the [employer] computer system.  Removed  osteophytes and took our capture poses at 0 and 90 degrees.  We then templated our resection cuts.  Initial gaps were 18 mm mm.  With templating we were 18/18 mm medial and lateral in extension and 19/19 mm medial to lateral with on the femur at 90 degrees  of flexion.  Tibia was cut in neutral  Once we were satisfied with the soft tissue tension, we went ahead and proceeded with cutting of the femoral and tibial surfaces.  Robotically assisted cuts were made according to templates.  Confirming our checkpoints in between for accuracy.  Removed  all of fracture bone pieces, soft tissue, meniscus, and posterior osteophytes.  We injected the posterior capsule.  Bony surfaces were then irrigated with a copious amount of saline irrigation and the trial components were inserted.  With the trial components  in position, confirmed rotation and the polyethylene trial the knee was placed through range of motion and stability was assessed.  Intraoperative resection was consistent with our templated plan.  The femoral trial was flush with no lateral overhang.   PEG drill was used on the femur.  We then proceeded with a resurfacing the patella.  Her original patellar thickness was 21 mm.  We removed all the osteophytes and cut to a 12 mm thickness.  A size 29 mm poly was selected with an overall thickness 20/21  mm.  Drilled and trial was placed. Excellent tracking of the patella with a towel clip.  Tibial component rotation was marked.   We then placed her tourniquet up to 300 mmHg an Esmarch was used.  Tibial polytrial was removed and retractors placed.  We  then punched the tibial keel assuring the tibial tray was flush to the anterior medial and posterior lateral tibia. Once we were satisfied with range of motion and stability the trial components were removed. A all bony surfaces were again thoroughly  irrigated.  And the final components were selected.  We cemented the tibia and placed our final poly component.  Cement was then placed on the femur and final femoral component was malleted into place and set flush.  Cement was then placed on the back  of the patella and our button was secured flush and excess cement was removed.  Range of motion was 125 degrees degrees.  Patient able to easily extend to neutral and was able to extend to 0 degrees.  Was 1 degree of varus throughout the range of motion.   The knee was again irrigated with saline irrigation.  The joint capsule was repaired using # 2 Ethibond suture in a figure-of-eight manner with interrupted sutures.  The knee was then placed in 90 of flexion while the subcutaneous tissues were repaired  using 2-0 Vicryl l suture.  4-0 Monocryl was used for the skin and skin glue was used.  A dry sterile Aquacell  bandage was applied. The patient tolerated the procedure well was taken to the post anesthesia care unit in good condition without complication.     A postoperative x-ray was checked.  The findings of the procedure were discussed with the patient's family at the conclusion of the operation.     David Soni PA-C was present throughout the entire case. Given the nature of the disease process and the procedure, a skilled surgical first assistant was necessary during the case. The assistant was necessary to hold retractors and to manipulate  the extremity during the procedure. A certified scrub tech was at the back table managing the instruments and supplies for the surgical  case.        Electronic Signatures:  David Ramon)  (Signed 26-Jul-2023 14:54)   Authored: Post Operative Note, Note Completion      Last Updated: 26-Jul-2023 14:54 by David Ramon)

## 2023-10-04 DIAGNOSIS — E78.2 HYPERLIPEMIA, MIXED: Primary | ICD-10-CM

## 2023-10-04 RX ORDER — ATORVASTATIN CALCIUM 10 MG/1
10 TABLET, FILM COATED ORAL NIGHTLY
Qty: 90 TABLET | Refills: 1 | Status: SHIPPED | OUTPATIENT
Start: 2023-10-04 | End: 2024-03-28

## 2023-10-18 PROBLEM — E04.2 MULTIPLE THYROID NODULES: Status: ACTIVE | Noted: 2023-10-18

## 2023-10-18 PROBLEM — E66.811 CLASS 1 OBESITY DUE TO EXCESS CALORIES WITH BODY MASS INDEX (BMI) OF 34.0 TO 34.9 IN ADULT: Status: ACTIVE | Noted: 2023-10-18

## 2023-10-18 PROBLEM — E03.9 HYPOTHYROIDISM: Status: ACTIVE | Noted: 2023-10-18

## 2023-10-18 PROBLEM — M54.2 NECK PAIN ON RIGHT SIDE: Status: ACTIVE | Noted: 2023-10-18

## 2023-10-18 PROBLEM — M54.12 CERVICAL RADICULOPATHY, CHRONIC: Status: ACTIVE | Noted: 2023-10-18

## 2023-10-18 PROBLEM — R32 URINARY INCONTINENCE: Status: ACTIVE | Noted: 2023-10-18

## 2023-10-18 PROBLEM — N39.0 UTI (URINARY TRACT INFECTION): Status: ACTIVE | Noted: 2023-10-18

## 2023-10-18 PROBLEM — M48.02 DEGENERATIVE CERVICAL SPINAL STENOSIS: Status: ACTIVE | Noted: 2023-10-18

## 2023-10-18 PROBLEM — G89.29 CHRONIC PAIN OF RIGHT KNEE: Status: ACTIVE | Noted: 2023-10-18

## 2023-10-18 PROBLEM — H69.90 EUSTACHIAN TUBE DISORDER: Status: ACTIVE | Noted: 2023-10-18

## 2023-10-18 PROBLEM — M25.561 CHRONIC PAIN OF RIGHT KNEE: Status: ACTIVE | Noted: 2023-10-18

## 2023-10-18 PROBLEM — G56.00 CARPAL TUNNEL SYNDROME: Status: ACTIVE | Noted: 2023-10-18

## 2023-10-18 PROBLEM — M54.30 SCIATICA: Status: ACTIVE | Noted: 2023-10-18

## 2023-10-18 PROBLEM — R19.5 POSITIVE COLORECTAL CANCER SCREENING USING COLOGUARD TEST: Status: ACTIVE | Noted: 2023-10-18

## 2023-10-18 PROBLEM — M17.10 OSTEOARTHRITIS, LOCALIZED, KNEE: Status: ACTIVE | Noted: 2023-10-18

## 2023-10-18 PROBLEM — M19.049 CMC ARTHRITIS: Status: ACTIVE | Noted: 2023-10-18

## 2023-10-18 PROBLEM — E66.09 CLASS 1 OBESITY DUE TO EXCESS CALORIES WITH BODY MASS INDEX (BMI) OF 34.0 TO 34.9 IN ADULT: Status: ACTIVE | Noted: 2023-10-18

## 2023-10-18 PROBLEM — M70.20 OLECRANON BURSITIS: Status: ACTIVE | Noted: 2023-10-18

## 2023-10-18 PROBLEM — G89.18 POST-OP PAIN: Status: ACTIVE | Noted: 2023-10-18

## 2023-10-18 PROBLEM — R12 HEARTBURN: Status: ACTIVE | Noted: 2023-10-18

## 2023-10-18 PROBLEM — E78.2 MIXED HYPERLIPIDEMIA: Status: ACTIVE | Noted: 2023-10-18

## 2023-10-18 RX ORDER — SULFAMETHOXAZOLE AND TRIMETHOPRIM 800; 160 MG/1; MG/1
1 TABLET ORAL 2 TIMES DAILY
COMMUNITY
Start: 2023-07-12 | End: 2023-10-24 | Stop reason: WASHOUT

## 2023-10-18 RX ORDER — LEVOTHYROXINE SODIUM 50 UG/1
50 TABLET ORAL DAILY
COMMUNITY
Start: 2023-07-10 | End: 2023-10-24 | Stop reason: SDUPTHER

## 2023-10-18 RX ORDER — CALCIUM/MAGNESIUM/ZINC 333-133-5
1 TABLET ORAL DAILY
COMMUNITY

## 2023-10-18 RX ORDER — LORATADINE 10 MG/1
1 CAPSULE, LIQUID FILLED ORAL DAILY
COMMUNITY

## 2023-10-21 PROBLEM — M25.439 WRIST SWELLING: Status: ACTIVE | Noted: 2023-10-21

## 2023-10-21 PROBLEM — Z96.651 S/P TOTAL KNEE REPLACEMENT, RIGHT: Status: ACTIVE | Noted: 2023-10-21

## 2023-10-21 RX ORDER — LEVOTHYROXINE SODIUM 25 UG/1
25 TABLET ORAL DAILY
COMMUNITY
End: 2023-10-24 | Stop reason: DRUGHIGH

## 2023-10-24 ENCOUNTER — OFFICE VISIT (OUTPATIENT)
Dept: PRIMARY CARE | Facility: CLINIC | Age: 78
End: 2023-10-24
Payer: MEDICARE

## 2023-10-24 VITALS
SYSTOLIC BLOOD PRESSURE: 124 MMHG | TEMPERATURE: 97.7 F | DIASTOLIC BLOOD PRESSURE: 64 MMHG | HEART RATE: 71 BPM | BODY MASS INDEX: 31.5 KG/M2 | HEIGHT: 62 IN | OXYGEN SATURATION: 95 % | WEIGHT: 171.16 LBS

## 2023-10-24 DIAGNOSIS — Z00.00 ENCOUNTER FOR PREVENTIVE HEALTH EXAMINATION: ICD-10-CM

## 2023-10-24 DIAGNOSIS — Z76.0 ENCOUNTER FOR MEDICATION REFILL: ICD-10-CM

## 2023-10-24 DIAGNOSIS — E03.9 ACQUIRED HYPOTHYROIDISM: ICD-10-CM

## 2023-10-24 DIAGNOSIS — N39.44 NOCTURNAL ENURESIS: ICD-10-CM

## 2023-10-24 DIAGNOSIS — E66.09 CLASS 1 OBESITY DUE TO EXCESS CALORIES WITH BODY MASS INDEX (BMI) OF 34.0 TO 34.9 IN ADULT, UNSPECIFIED WHETHER SERIOUS COMORBIDITY PRESENT: ICD-10-CM

## 2023-10-24 DIAGNOSIS — R30.0 DYSURIA: ICD-10-CM

## 2023-10-24 DIAGNOSIS — E78.2 MIXED HYPERLIPIDEMIA: ICD-10-CM

## 2023-10-24 DIAGNOSIS — E11.65 TYPE 2 DIABETES MELLITUS WITH HYPERGLYCEMIA, WITHOUT LONG-TERM CURRENT USE OF INSULIN (MULTI): Primary | ICD-10-CM

## 2023-10-24 DIAGNOSIS — Z71.2 ENCOUNTER TO DISCUSS TEST RESULTS: ICD-10-CM

## 2023-10-24 LAB
APPEARANCE UR: ABNORMAL
BACTERIA #/AREA URNS AUTO: ABNORMAL /HPF
BILIRUB UR STRIP.AUTO-MCNC: NEGATIVE MG/DL
COLOR UR: YELLOW
GLUCOSE UR STRIP.AUTO-MCNC: NEGATIVE MG/DL
KETONES UR STRIP.AUTO-MCNC: NEGATIVE MG/DL
LEUKOCYTE ESTERASE UR QL STRIP.AUTO: ABNORMAL
MUCOUS THREADS #/AREA URNS AUTO: ABNORMAL /LPF
NITRITE UR QL STRIP.AUTO: NEGATIVE
PH UR STRIP.AUTO: 6 [PH]
PROT UR STRIP.AUTO-MCNC: NEGATIVE MG/DL
RBC # UR STRIP.AUTO: NEGATIVE /UL
RBC #/AREA URNS AUTO: ABNORMAL /HPF
SP GR UR STRIP.AUTO: 1.01
UROBILINOGEN UR STRIP.AUTO-MCNC: <2 MG/DL
WBC #/AREA URNS AUTO: ABNORMAL /HPF
WBC CLUMPS #/AREA URNS AUTO: ABNORMAL /HPF

## 2023-10-24 PROCEDURE — 99214 OFFICE O/P EST MOD 30 MIN: CPT | Performed by: FAMILY MEDICINE

## 2023-10-24 PROCEDURE — 1159F MED LIST DOCD IN RCRD: CPT | Performed by: FAMILY MEDICINE

## 2023-10-24 PROCEDURE — 1036F TOBACCO NON-USER: CPT | Performed by: FAMILY MEDICINE

## 2023-10-24 PROCEDURE — 81001 URINALYSIS AUTO W/SCOPE: CPT

## 2023-10-24 PROCEDURE — 87186 SC STD MICRODIL/AGAR DIL: CPT

## 2023-10-24 PROCEDURE — 87086 URINE CULTURE/COLONY COUNT: CPT

## 2023-10-24 RX ORDER — LEVOTHYROXINE SODIUM 50 UG/1
50 TABLET ORAL DAILY
Qty: 90 TABLET | Refills: 1 | Status: SHIPPED | OUTPATIENT
Start: 2023-10-24 | End: 2023-10-24 | Stop reason: SDUPTHER

## 2023-10-24 ASSESSMENT — ENCOUNTER SYMPTOMS
LOSS OF SENSATION IN FEET: 0
OCCASIONAL FEELINGS OF UNSTEADINESS: 1
DEPRESSION: 0

## 2023-10-24 ASSESSMENT — PATIENT HEALTH QUESTIONNAIRE - PHQ9
2. FEELING DOWN, DEPRESSED OR HOPELESS: NOT AT ALL
10. IF YOU CHECKED OFF ANY PROBLEMS, HOW DIFFICULT HAVE THESE PROBLEMS MADE IT FOR YOU TO DO YOUR WORK, TAKE CARE OF THINGS AT HOME, OR GET ALONG WITH OTHER PEOPLE: NOT DIFFICULT AT ALL
SUM OF ALL RESPONSES TO PHQ9 QUESTIONS 1 AND 2: 1
1. LITTLE INTEREST OR PLEASURE IN DOING THINGS: SEVERAL DAYS

## 2023-10-24 NOTE — PROGRESS NOTES
"Subjective   Chief complaint: Teresita Begum is a 78 y.o. female who presents for Follow-up (Patient is in office today for a 6 month follow-up), Med Refill, and UTI (Symptoms has recently started again after knee surgery. Patient states that is having frequent urine and incontinence that is worse than normal. ).    HPI:  Doing fine.  Had knee surgery this past July.  No concerns with medicines;  will need refills.  Has one last physical therapy appointment.  Sleep is still troublesome for her.  Hard to fall asleep and then nocturia.    Eating fine.  Eating less since surgery;  lost some weight since surgery.  Diet and exercise recommendations discussed.            Objective   /64 (BP Location: Left arm, Patient Position: Sitting, BP Cuff Size: Adult)   Pulse 71   Temp 36.5 °C (97.7 °F) (Temporal)   Ht 1.562 m (5' 1.5\")   Wt 77.6 kg (171 lb 2.6 oz)   SpO2 95% Comment: RA  BMI 31.82 kg/m²     Physical Exam  General:  Alert, oriented, no acute distress  Eyes:  Sclerae white, PER, conjunctivae clear  ENT:  No nasal congestion.    Neck: Supple  Respiratory:  Normal breath sounds.  No wheezing, rhonchi nor crackles.  No dyspnea.  Cardiovascular:  S1 and S2 positive.  Regular rate and rhythm.  No gallops.  No murmurs.  Vascular:  No edema.  Skin warm and dry.  CNS:  No gross neurological deficits.  Gait within normal limits.    Psychiatric:  Affect is positive and appropriate.  No depression.  No anxiety.     Review of Systems   I have reviewed and reconciled the medication list with the patient today.   Current Outpatient Medications:     atorvastatin (Lipitor) 10 mg tablet, TAKE 1 TABLET BY MOUTH EVERYDAY AT BEDTIME, Disp: 90 tablet, Rfl: 1    calcium-magnesium-zinc 333-133-5 mg tablet, Take 1 tablet by mouth once daily., Disp: , Rfl:     levothyroxine (Synthroid, Levoxyl) 50 mcg tablet, Take 1 tablet (50 mcg) by mouth once daily., Disp: , Rfl:     loratadine (Claritin Liqui-Gel) 10 mg capsule, Take 1 " capsule by mouth once daily., Disp: , Rfl:     multivit-min/ferrous fumarate (MULTI VITAMIN ORAL), Take 1 tablet by mouth once daily., Disp: , Rfl:      Imaging:  No results found.     Labs reviewed:    Lab Results   Component Value Date    WBC CANCELED 07/28/2023    HGB CANCELED 07/28/2023    HCT CANCELED 07/28/2023    PLT CANCELED 07/28/2023    CHOL 169 07/10/2023    TRIG 130 07/10/2023    HDL 43.2 07/10/2023    ALT 37 07/10/2023    AST 29 07/10/2023    NA CANCELED 07/28/2023    K CANCELED 07/28/2023    CL CANCELED 07/28/2023    CREATININE CANCELED 07/28/2023    BUN CANCELED 07/28/2023    CO2 CANCELED 07/28/2023    TSH 2.48 03/22/2023    INR 1.0 07/10/2023       Assessment/Plan   Problem List Items Addressed This Visit       Class 1 obesity due to excess calories with body mass index (BMI) of 34.0 to 34.9 in adult    Relevant Orders    Vitamin D 25-Hydroxy,Total (for eval of Vitamin D levels)    Encounter for medication refill     Medications reviewed, refill given.         Hypothyroidism     Will be on the next blood test.         Relevant Orders    Thyroglobulin and Antithyroglobulin    TSH with reflex to Free T4 if abnormal    Mixed hyperlipidemia     Will be rechecked on the next blood test.         Relevant Orders    CBC and Auto Differential    Lipid Panel    Type 2 diabetes mellitus with hyperglycemia, without long-term current use of insulin (CMS/HCC) - Primary     Will recheck level on next blood test.         Relevant Orders    Albumin , Urine Random    Hemoglobin A1C    CBC and Auto Differential    Comprehensive Metabolic Panel    Urinary incontinence     Urogynecology referral.          Other Visit Diagnoses       Encounter to discuss test results        Encounter for preventive health examination        Relevant Orders    Hepatitis C antibody    CBC and Auto Differential            Continue current medications as listed  Follow up in 6 months.

## 2023-10-26 DIAGNOSIS — N30.00 ACUTE CYSTITIS WITHOUT HEMATURIA: Primary | ICD-10-CM

## 2023-10-26 LAB — BACTERIA UR CULT: ABNORMAL

## 2023-10-26 RX ORDER — SULFAMETHOXAZOLE AND TRIMETHOPRIM 800; 160 MG/1; MG/1
1 TABLET ORAL 2 TIMES DAILY
Qty: 6 TABLET | Refills: 0 | Status: SHIPPED | OUTPATIENT
Start: 2023-10-26 | End: 2023-10-29

## 2023-10-30 RX ORDER — LEVOTHYROXINE SODIUM 50 UG/1
50 TABLET ORAL DAILY
Qty: 90 TABLET | Refills: 1 | Status: SHIPPED | OUTPATIENT
Start: 2023-10-30 | End: 2023-12-26 | Stop reason: SDUPTHER

## 2023-10-31 ENCOUNTER — LAB (OUTPATIENT)
Dept: LAB | Facility: LAB | Age: 78
End: 2023-10-31
Payer: MEDICARE

## 2023-11-13 ENCOUNTER — OFFICE VISIT (OUTPATIENT)
Dept: ORTHOPEDIC SURGERY | Facility: CLINIC | Age: 78
End: 2023-11-13
Payer: MEDICARE

## 2023-11-13 ENCOUNTER — ANCILLARY PROCEDURE (OUTPATIENT)
Dept: RADIOLOGY | Facility: CLINIC | Age: 78
End: 2023-11-13
Payer: MEDICARE

## 2023-11-13 DIAGNOSIS — Z98.890 S/P LEFT KNEE ARTHROSCOPY: Primary | ICD-10-CM

## 2023-11-13 DIAGNOSIS — Z98.890 S/P LEFT KNEE ARTHROSCOPY: ICD-10-CM

## 2023-11-13 PROCEDURE — 1036F TOBACCO NON-USER: CPT | Performed by: ORTHOPAEDIC SURGERY

## 2023-11-13 PROCEDURE — 99213 OFFICE O/P EST LOW 20 MIN: CPT | Mod: PO,25 | Performed by: ORTHOPAEDIC SURGERY

## 2023-11-13 PROCEDURE — 73560 X-RAY EXAM OF KNEE 1 OR 2: CPT | Mod: RIGHT SIDE | Performed by: ORTHOPAEDIC SURGERY

## 2023-11-13 PROCEDURE — 1159F MED LIST DOCD IN RCRD: CPT | Performed by: ORTHOPAEDIC SURGERY

## 2023-11-13 PROCEDURE — 99213 OFFICE O/P EST LOW 20 MIN: CPT | Performed by: ORTHOPAEDIC SURGERY

## 2023-11-13 PROCEDURE — 73560 X-RAY EXAM OF KNEE 1 OR 2: CPT | Mod: RT

## 2023-11-13 RX ORDER — AMOXICILLIN 500 MG/1
CAPSULE ORAL
Qty: 4 CAPSULE | Refills: 3 | Status: SHIPPED | OUTPATIENT
Start: 2023-11-13 | End: 2023-12-14 | Stop reason: WASHOUT

## 2023-11-13 NOTE — PROGRESS NOTES
History of Present Illness   Patient presents 4 months out from her right total knee arthroplasty.  She states that she is doing well she is finished with physical therapy she has not tried kneeling on it she needs a note for her dentist and a prescription that she has an appointment in January.     Review of Systems   GENERAL: Negative for malaise, significant weight loss, fever  MUSCULOSKELETAL: see HPI  NEURO:  Negative     Physical Exam  General appearance well-nourished well-developed in no x3 no acute distress  Right knee exam shows well-healed incision full active range of motion of the knee 5 out of 5 strength with flexion extension lower extremity gross neurovascular intact       Imaging  Well aligned total knee replacement.  No evidence of loosening     Assessment   Status post right total knee replacement     Plan  Sent in dental antibiotic prophylaxis and provided note for the patient  See her back at the 1 year point and get x-rays at that time  Went over that she can start kneeling on the knee on a soft surface and with a pad.    Past Medical History:   Diagnosis Date    Other specified health status     No pertinent past medical history    Personal history of other endocrine, nutritional and metabolic disease     History of diabetes mellitus    Personal history of other specified conditions     History of chest pain    Personal history of other specified conditions     History of balance disorder    Personal history of urinary (tract) infections 03/28/2022    History of cystitis    Personal history of urinary (tract) infections 05/16/2022    History of urinary tract infection    Unspecified osteoarthritis, unspecified site     Osteoarthritis       Medication Documentation Review Audit       Reviewed by Génesis Cardoza MA (Medical Assistant) on 11/13/23 at 1120      Medication Order Taking? Sig Documenting Provider Last Dose Status   atorvastatin (Lipitor) 10 mg tablet [id number] No TAKE 1 TABLET BY  MOUTH EVERYDAY AT BEDTIME Nadira Keller MD Taking Active   calcium-magnesium-zinc 333-133-5 mg tablet [id number] No Take 1 tablet by mouth once daily. Historical Provider, MD Taking Active   levothyroxine (Synthroid, Levoxyl) 50 mcg tablet [id number]  Take 1 tablet (50 mcg) by mouth once daily. Nadira Keller MD  Active   loratadine (Claritin Liqui-Gel) 10 mg capsule [id number] No Take 1 capsule by mouth once daily. Historical Provider, MD Taking Active   multivit-min/ferrous fumarate (MULTI VITAMIN ORAL) [id number] No Take 1 tablet by mouth once daily. Historical Provider, MD Taking Active                    Allergies   Allergen Reactions    Simvastatin Unknown    Sulfamethoxazole Other     Muscle pain and weakness        Social History     Socioeconomic History    Marital status: Single     Spouse name: Not on file    Number of children: Not on file    Years of education: Not on file    Highest education level: Not on file   Occupational History    Not on file   Tobacco Use    Smoking status: Former     Types: Cigarettes    Smokeless tobacco: Never   Vaping Use    Vaping Use: Never used   Substance and Sexual Activity    Alcohol use: Yes     Comment: couple beers a week    Drug use: Never    Sexual activity: Not on file   Other Topics Concern    Not on file   Social History Narrative    Not on file     Social Determinants of Health     Financial Resource Strain: Not on file   Food Insecurity: Not on file   Transportation Needs: Not on file   Physical Activity: Not on file   Stress: Not on file   Social Connections: Not on file   Intimate Partner Violence: Not on file   Housing Stability: Not on file       Past Surgical History:   Procedure Laterality Date    OTHER SURGICAL HISTORY  10/26/2021    Carpal tunnel surgery    OTHER SURGICAL HISTORY  10/26/2021    Foot surgery    OTHER SURGICAL HISTORY  10/26/2021    Complete colonoscopy    OTHER SURGICAL HISTORY  02/28/2022    Subtotal thyroidectomy     OTHER SURGICAL HISTORY  09/09/2020    Back surgery    OTHER SURGICAL HISTORY  09/09/2020    Cyst excision    OTHER SURGICAL HISTORY  09/09/2020    Colonoscopy    OTHER SURGICAL HISTORY  09/09/2020    [place] tooth extraction    TOTAL KNEE ARTHROPLASTY Right

## 2023-12-05 DIAGNOSIS — Z87.891 HISTORY OF SMOKING 25-50 PACK YEARS: Primary | ICD-10-CM

## 2023-12-11 ENCOUNTER — ANCILLARY PROCEDURE (OUTPATIENT)
Dept: RADIOLOGY | Facility: CLINIC | Age: 78
End: 2023-12-11
Payer: MEDICARE

## 2023-12-11 DIAGNOSIS — Z87.891 HISTORY OF SMOKING 25-50 PACK YEARS: ICD-10-CM

## 2023-12-11 PROCEDURE — 71271 CT THORAX LUNG CANCER SCR C-: CPT

## 2023-12-11 PROCEDURE — 71271 CT THORAX LUNG CANCER SCR C-: CPT | Performed by: RADIOLOGY

## 2023-12-14 ENCOUNTER — OFFICE VISIT (OUTPATIENT)
Dept: OBSTETRICS AND GYNECOLOGY | Facility: CLINIC | Age: 78
End: 2023-12-14
Payer: MEDICARE

## 2023-12-14 VITALS — WEIGHT: 173 LBS | DIASTOLIC BLOOD PRESSURE: 60 MMHG | BODY MASS INDEX: 32.16 KG/M2 | SYSTOLIC BLOOD PRESSURE: 116 MMHG

## 2023-12-14 DIAGNOSIS — N32.81 OAB (OVERACTIVE BLADDER): Primary | ICD-10-CM

## 2023-12-14 DIAGNOSIS — N39.44 NOCTURNAL ENURESIS: ICD-10-CM

## 2023-12-14 DIAGNOSIS — R82.71 BACTERIA IN URINE: ICD-10-CM

## 2023-12-14 PROCEDURE — 1159F MED LIST DOCD IN RCRD: CPT | Performed by: OBSTETRICS & GYNECOLOGY

## 2023-12-14 PROCEDURE — 87086 URINE CULTURE/COLONY COUNT: CPT

## 2023-12-14 PROCEDURE — 99204 OFFICE O/P NEW MOD 45 MIN: CPT | Performed by: OBSTETRICS & GYNECOLOGY

## 2023-12-14 PROCEDURE — 1036F TOBACCO NON-USER: CPT | Performed by: OBSTETRICS & GYNECOLOGY

## 2023-12-14 PROCEDURE — 87186 SC STD MICRODIL/AGAR DIL: CPT

## 2023-12-14 RX ORDER — MIRABEGRON 25 MG/1
25 TABLET, FILM COATED, EXTENDED RELEASE ORAL DAILY
Qty: 90 TABLET | Refills: 2 | Status: SHIPPED | OUTPATIENT
Start: 2023-12-14

## 2023-12-14 RX ORDER — MIRABEGRON 25 MG/1
25 TABLET, FILM COATED, EXTENDED RELEASE ORAL DAILY
Qty: 90 TABLET | Refills: 2 | Status: SHIPPED | OUTPATIENT
Start: 2023-12-14 | End: 2024-05-13 | Stop reason: WASHOUT

## 2023-12-14 NOTE — PROGRESS NOTES
GYN PROGRESS NOTE          CC:     Chief Complaint   Patient presents with    Urinary Incontinence     New pt referred by dr alonso       HPI:  Lien Begum Has significant urgency with leakage    Void frequently approximately every 1 hour    No Incontinence with cough or sneeze laugh and standing    Changes pad 2 times daily    Feels that she does not empty her bladder completely    Nocturia x4    Some snoring      Recently had a positive urine culture borderlines stefani is like she has 1 today again overlapping symptoms would recommend mannose D and topical vulvar estrogen after recurrent UTI      ROS:  GEN - no fevers or chills  RESP - no SOB or cough  GYN - see HPI      HISTORY:  Past Medical History:   Diagnosis Date    Other specified health status     No pertinent past medical history    Personal history of other endocrine, nutritional and metabolic disease     History of diabetes mellitus    Personal history of other specified conditions     History of chest pain    Personal history of other specified conditions     History of balance disorder    Personal history of urinary (tract) infections 03/28/2022    History of cystitis    Personal history of urinary (tract) infections 05/16/2022    History of urinary tract infection    Unspecified osteoarthritis, unspecified site     Osteoarthritis     Past Surgical History:   Procedure Laterality Date    OTHER SURGICAL HISTORY  10/26/2021    Carpal tunnel surgery    OTHER SURGICAL HISTORY  10/26/2021    Foot surgery    OTHER SURGICAL HISTORY  10/26/2021    Complete colonoscopy    OTHER SURGICAL HISTORY  02/28/2022    Subtotal thyroidectomy    OTHER SURGICAL HISTORY  09/09/2020    Back surgery    OTHER SURGICAL HISTORY  09/09/2020    Cyst excision    OTHER SURGICAL HISTORY  09/09/2020    Colonoscopy    OTHER SURGICAL HISTORY  09/09/2020    [place] tooth extraction    TOTAL KNEE ARTHROPLASTY Right      Social History     Socioeconomic History    Marital status:  Single     Spouse name: Not on file    Number of children: Not on file    Years of education: Not on file    Highest education level: Not on file   Occupational History    Not on file   Tobacco Use    Smoking status: Former     Types: Cigarettes    Smokeless tobacco: Never   Vaping Use    Vaping Use: Never used   Substance and Sexual Activity    Alcohol use: Yes     Comment: couple beers a week    Drug use: Never    Sexual activity: Not on file   Other Topics Concern    Not on file   Social History Narrative    Not on file     Social Determinants of Health     Financial Resource Strain: Not on file   Food Insecurity: Not on file   Transportation Needs: Not on file   Physical Activity: Not on file   Stress: Not on file   Social Connections: Not on file   Intimate Partner Violence: Not on file   Housing Stability: Not on file     Cancer-related family history includes Breast cancer in her maternal grandmother and paternal grandmother.       PHYSICAL EXAM:  /60 (BP Location: Left arm, Patient Position: Sitting)   Wt 78.5 kg (173 lb)   BMI 32.16 kg/m²   GEN:  A&O, NAD  HEENT:  head HC/AT, no visible goiter  PSYCH:  normal affect, non-anxious      IMPRESSION/PLAN:    Problem List Items Addressed This Visit       Urinary incontinence     Other Visit Diagnoses       OAB (overactive bladder)    -  Primary    Relevant Medications    mirabegron (Mybetriq) 25 mg tablet extended release 24 hr 24 hr tablet    mirabegron (Mybetriq) 25 mg tablet extended release 24 hr 24 hr tablet           Start Myrbetriq  Urine culture     if recurrent UTI recommend [place] and topical vulvar estrogen        Aneudy Melendez MD

## 2023-12-17 LAB — BACTERIA UR CULT: ABNORMAL

## 2023-12-19 DIAGNOSIS — R82.71 BACTERIA IN URINE: Primary | ICD-10-CM

## 2023-12-19 RX ORDER — GRANULES FOR ORAL 3 G/1
3 POWDER ORAL ONCE
Qty: 3 G | Refills: 0 | Status: SHIPPED | OUTPATIENT
Start: 2023-12-19 | End: 2023-12-19

## 2023-12-19 RX ORDER — TRIMETHOPRIM 100 MG/1
100 TABLET ORAL 2 TIMES DAILY
Qty: 10 TABLET | Refills: 0 | Status: SHIPPED | OUTPATIENT
Start: 2023-12-19 | End: 2023-12-24

## 2023-12-26 DIAGNOSIS — E03.9 ACQUIRED HYPOTHYROIDISM: ICD-10-CM

## 2023-12-29 RX ORDER — LEVOTHYROXINE SODIUM 50 UG/1
50 TABLET ORAL DAILY
Qty: 90 TABLET | Refills: 1 | Status: SHIPPED | OUTPATIENT
Start: 2023-12-29 | End: 2024-05-13 | Stop reason: SDUPTHER

## 2024-01-05 DIAGNOSIS — N39.0 URINARY TRACT INFECTION WITHOUT HEMATURIA, SITE UNSPECIFIED: ICD-10-CM

## 2024-01-05 RX ORDER — CIPROFLOXACIN 500 MG/1
500 TABLET ORAL 2 TIMES DAILY
Qty: 14 TABLET | Refills: 0 | Status: SHIPPED | OUTPATIENT
Start: 2024-01-05 | End: 2024-01-12

## 2024-01-29 DIAGNOSIS — Z12.31 BREAST CANCER SCREENING BY MAMMOGRAM: Primary | ICD-10-CM

## 2024-02-19 ENCOUNTER — HOSPITAL ENCOUNTER (OUTPATIENT)
Dept: WOMENS IMAGING | Age: 79
Discharge: HOME | End: 2024-02-21
Payer: MEDICARE

## 2024-02-19 VITALS — BODY MASS INDEX: 33.49 KG/M2 | HEIGHT: 62 IN

## 2024-02-19 DIAGNOSIS — Z12.31 ENCOUNTER FOR SCREENING MAMMOGRAM FOR MALIGNANT NEOPLASM OF BREAST: ICD-10-CM

## 2024-02-19 DIAGNOSIS — Z12.31 BREAST CANCER SCREENING BY MAMMOGRAM: ICD-10-CM

## 2024-02-19 PROCEDURE — 77063 BREAST TOMOSYNTHESIS BI: CPT

## 2024-03-28 DIAGNOSIS — E78.2 HYPERLIPEMIA, MIXED: ICD-10-CM

## 2024-03-28 RX ORDER — ATORVASTATIN CALCIUM 10 MG/1
10 TABLET, FILM COATED ORAL NIGHTLY
Qty: 90 TABLET | Refills: 1 | Status: SHIPPED | OUTPATIENT
Start: 2024-03-28

## 2024-04-19 ENCOUNTER — LAB (OUTPATIENT)
Dept: LAB | Facility: LAB | Age: 79
End: 2024-04-19
Payer: MEDICARE

## 2024-04-19 DIAGNOSIS — E66.09 CLASS 1 OBESITY DUE TO EXCESS CALORIES WITH BODY MASS INDEX (BMI) OF 34.0 TO 34.9 IN ADULT, UNSPECIFIED WHETHER SERIOUS COMORBIDITY PRESENT: ICD-10-CM

## 2024-04-19 DIAGNOSIS — Z00.00 ENCOUNTER FOR PREVENTIVE HEALTH EXAMINATION: ICD-10-CM

## 2024-04-19 DIAGNOSIS — E78.2 MIXED HYPERLIPIDEMIA: ICD-10-CM

## 2024-04-19 DIAGNOSIS — E11.65 TYPE 2 DIABETES MELLITUS WITH HYPERGLYCEMIA, WITHOUT LONG-TERM CURRENT USE OF INSULIN (MULTI): ICD-10-CM

## 2024-04-19 DIAGNOSIS — E03.9 ACQUIRED HYPOTHYROIDISM: ICD-10-CM

## 2024-04-19 LAB
25(OH)D3 SERPL-MCNC: 33 NG/ML (ref 30–100)
ALBUMIN SERPL BCP-MCNC: 4.1 G/DL (ref 3.4–5)
ALP SERPL-CCNC: 54 U/L (ref 33–136)
ALT SERPL W P-5'-P-CCNC: 12 U/L (ref 7–45)
ANION GAP SERPL CALC-SCNC: 12 MMOL/L (ref 10–20)
AST SERPL W P-5'-P-CCNC: 17 U/L (ref 9–39)
BASOPHILS # BLD AUTO: 0.04 X10*3/UL (ref 0–0.1)
BASOPHILS NFR BLD AUTO: 0.7 %
BILIRUB SERPL-MCNC: 0.5 MG/DL (ref 0–1.2)
BUN SERPL-MCNC: 28 MG/DL (ref 6–23)
CALCIUM SERPL-MCNC: 9.6 MG/DL (ref 8.6–10.3)
CHLORIDE SERPL-SCNC: 104 MMOL/L (ref 98–107)
CHOLEST SERPL-MCNC: 174 MG/DL (ref 0–199)
CHOLESTEROL/HDL RATIO: 2.8
CO2 SERPL-SCNC: 26 MMOL/L (ref 21–32)
CREAT SERPL-MCNC: 0.96 MG/DL (ref 0.5–1.05)
CREAT UR-MCNC: 74.8 MG/DL (ref 20–320)
EGFRCR SERPLBLD CKD-EPI 2021: 61 ML/MIN/1.73M*2
EOSINOPHIL # BLD AUTO: 0.22 X10*3/UL (ref 0–0.4)
EOSINOPHIL NFR BLD AUTO: 3.8 %
ERYTHROCYTE [DISTWIDTH] IN BLOOD BY AUTOMATED COUNT: 14 % (ref 11.5–14.5)
EST. AVERAGE GLUCOSE BLD GHB EST-MCNC: 111 MG/DL
GLUCOSE SERPL-MCNC: 92 MG/DL (ref 74–99)
HBA1C MFR BLD: 5.5 %
HCT VFR BLD AUTO: 35.6 % (ref 36–46)
HCV AB SER QL: NONREACTIVE
HDLC SERPL-MCNC: 62 MG/DL
HGB BLD-MCNC: 11.8 G/DL (ref 12–16)
IMM GRANULOCYTES # BLD AUTO: 0.01 X10*3/UL (ref 0–0.5)
IMM GRANULOCYTES NFR BLD AUTO: 0.2 % (ref 0–0.9)
LDLC SERPL CALC-MCNC: 87 MG/DL
LYMPHOCYTES # BLD AUTO: 1.26 X10*3/UL (ref 0.8–3)
LYMPHOCYTES NFR BLD AUTO: 21.5 %
MCH RBC QN AUTO: 30.4 PG (ref 26–34)
MCHC RBC AUTO-ENTMCNC: 33.1 G/DL (ref 32–36)
MCV RBC AUTO: 92 FL (ref 80–100)
MICROALBUMIN UR-MCNC: <7 MG/L
MICROALBUMIN/CREAT UR: NORMAL MG/G{CREAT}
MONOCYTES # BLD AUTO: 0.58 X10*3/UL (ref 0.05–0.8)
MONOCYTES NFR BLD AUTO: 9.9 %
NEUTROPHILS # BLD AUTO: 3.74 X10*3/UL (ref 1.6–5.5)
NEUTROPHILS NFR BLD AUTO: 63.9 %
NON HDL CHOLESTEROL: 112 MG/DL (ref 0–149)
NRBC BLD-RTO: 0 /100 WBCS (ref 0–0)
PLATELET # BLD AUTO: 217 X10*3/UL (ref 150–450)
POTASSIUM SERPL-SCNC: 4.6 MMOL/L (ref 3.5–5.3)
PROT SERPL-MCNC: 6 G/DL (ref 6.4–8.2)
RBC # BLD AUTO: 3.88 X10*6/UL (ref 4–5.2)
SODIUM SERPL-SCNC: 137 MMOL/L (ref 136–145)
TRIGL SERPL-MCNC: 125 MG/DL (ref 0–149)
TSH SERPL-ACNC: 2.74 MIU/L (ref 0.44–3.98)
VLDL: 25 MG/DL (ref 0–40)
WBC # BLD AUTO: 5.9 X10*3/UL (ref 4.4–11.3)

## 2024-04-19 PROCEDURE — 84432 ASSAY OF THYROGLOBULIN: CPT

## 2024-04-19 PROCEDURE — 84443 ASSAY THYROID STIM HORMONE: CPT

## 2024-04-19 PROCEDURE — 82570 ASSAY OF URINE CREATININE: CPT

## 2024-04-19 PROCEDURE — 85025 COMPLETE CBC W/AUTO DIFF WBC: CPT

## 2024-04-19 PROCEDURE — 86800 THYROGLOBULIN ANTIBODY: CPT

## 2024-04-19 PROCEDURE — 86803 HEPATITIS C AB TEST: CPT

## 2024-04-19 PROCEDURE — 36415 COLL VENOUS BLD VENIPUNCTURE: CPT

## 2024-04-19 PROCEDURE — 82306 VITAMIN D 25 HYDROXY: CPT

## 2024-04-19 PROCEDURE — 83036 HEMOGLOBIN GLYCOSYLATED A1C: CPT

## 2024-04-19 PROCEDURE — 80053 COMPREHEN METABOLIC PANEL: CPT

## 2024-04-19 PROCEDURE — 82043 UR ALBUMIN QUANTITATIVE: CPT

## 2024-04-19 PROCEDURE — 80061 LIPID PANEL: CPT

## 2024-04-19 NOTE — RESULT ENCOUNTER NOTE
Some abnormal lab results.  She can keep her scheduled apt with me for next month and we can review her results together.

## 2024-04-20 LAB
BILL ONLY-THYROGLOBULIN: NORMAL
THYROGLOB AB SERPL-ACNC: <0.9 IU/ML (ref 0–4)
THYROGLOB SERPL-MCNC: 8.9 NG/ML (ref 1.3–31.8)
THYROGLOB SERPL-MCNC: NORMAL NG/ML (ref 1.3–31.8)

## 2024-05-10 PROBLEM — Z00.00 MEDICARE ANNUAL WELLNESS VISIT, SUBSEQUENT: Status: ACTIVE | Noted: 2024-05-10

## 2024-05-10 NOTE — PROGRESS NOTES
"Subjective :  Chief Complaint: Lien Begum is an 78 y.o. female here for an annual wellness visit and general medical care and f/u.     HPI:  Here for an annual wellness visit.  Medications:  no concerns  Diet: discussed healthy guidelines  Exercise:  history of right knee replacement.  Discussed healthy guidelines.  Now starting to play golf again.  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  Sleep:  sometimes uses OTC melatonin.  Can sleep fine sometimes.  Mood:  good  Ophthalmology:  up to date  Dental:  no concerns   Recently had blood work done and is here to review those results together.  Has also had a runny nose and congestion during the last week which is not improving.  She feels she needs some help with this.  No fevers.  Denies that she has allergies.  OTC medication not  helpful,  No shortness of breath, chest pains, or GI complaints.          Review of Systems  All systems reviewed and negative except for what was mentioned in the HPI    Objective   /70 (BP Location: Left arm, Patient Position: Sitting, BP Cuff Size: Adult)   Pulse 72   Temp 36.7 °C (98 °F) (Temporal)   Ht 1.562 m (5' 1.5\")   Wt 81.2 kg (179 lb)   BMI 33.27 kg/m²     Physical Exam  General:  Alert, oriented, no acute distress  Eyes:  Sclerae white, PER, conjunctivae clear  ENT:  No nasal congestion.    Neck: Supple  Endocrine:    Respiratory:  Normal breath sounds.  No wheezing, rhonchi nor crackles.  No dyspnea.  Cardiovascular:  S1 and S2 positive.  Regular rate and rhythm.  No gallops.  No murmurs.  Vascular:  No edema.  Skin warm and dry.  CNS:  No gross neurological deficits.  Gait within normal limits.    Psychiatric:  Affect is positive and appropriate.  No depression.  No anxiety.    Imaging:  No results found.     Labs reviewed:    Lab Results   Component Value Date    WBC 5.9 04/19/2024    RBC 3.88 (L) 04/19/2024    HGB 11.8 (L) 04/19/2024    HCT 35.6 (L) 04/19/2024    MCV 92 04/19/2024     04/19/2024    CHOL 174 " 04/19/2024    TRIG 125 04/19/2024    HDL 62.0 04/19/2024    ALT 12 04/19/2024    AST 17 04/19/2024     04/19/2024    K 4.6 04/19/2024     04/19/2024    CREATININE 0.96 04/19/2024    BUN 28 (H) 04/19/2024    CO2 26 04/19/2024    TSH 2.74 04/19/2024    INR 1.0 07/10/2023    HGBA1C 5.5 04/19/2024       Past Medical, Surgical, and Family History reviewed and updated in chart.    I have reviewed and reconciled the medication list with the patient today.   Current Outpatient Medications:     atorvastatin (Lipitor) 10 mg tablet, TAKE 1 TABLET BY MOUTH EVERYDAY AT BEDTIME, Disp: 90 tablet, Rfl: 1    calcium-magnesium-zinc 333-133-5 mg tablet, Take 1 tablet by mouth once daily., Disp: , Rfl:     loratadine (Claritin Liqui-Gel) 10 mg capsule, Take 1 capsule by mouth once daily., Disp: , Rfl:     mirabegron (Mybetriq) 25 mg tablet extended release 24 hr 24 hr tablet, Take 1 tablet (25 mg) by mouth once daily., Disp: 90 tablet, Rfl: 2    multivit-min/ferrous fumarate (MULTI VITAMIN ORAL), Take 1 tablet by mouth once daily., Disp: , Rfl:     azithromycin (Zithromax) 250 mg tablet, Take 2 tablets (500 mg) by mouth once daily for 1 day, THEN 1 tablet (250 mg) once daily for 4 days. Take 2 tabs (500 mg) by mouth today, than 1 daily for 4 days.., Disp: 6 tablet, Rfl: 0    levothyroxine (Synthroid, Levoxyl) 50 mcg tablet, Take 1 tablet (50 mcg) by mouth once daily., Disp: 90 tablet, Rfl: 3     List of current healthcare providers:  Patient Care Team:  Nadira Keller MD as PCP - General (Family Medicine)  Nadira Keller MD as PCP - [facility] [place] [facility] Attributed Provider     Assessment/Plan :  Problem List Items Addressed This Visit       Encounter to discuss test results     Reviewed lab/testing results with the patient and provided patient education regarding the results.           Hypothyroidism     Stable.  Continue present management.         Relevant Medications    levothyroxine (Synthroid, Levoxyl) 50  mcg tablet    Medicare annual wellness visit, subsequent - Primary     Age appropriate preventive measures reviewed and discussed.  Diet and exercise recommendations discussed.             Upper respiratory tract infection    Relevant Medications    azithromycin (Zithromax) 250 mg tablet     Other Visit Diagnoses       Routine general medical examination at health care facility              The following health maintenance schedule was reviewed with the patient and provided in printed form in the after visit summary:  Health Maintenance   Topic Date Due    Diabetes: Foot Exam  Never done    RSV Pregnant patients and/or  patients aged 60+ years (1 - 1-dose 60+ series) Never done    Diabetes: Retinopathy Screening  10/04/2022    COVID-19 Vaccine (6 - 2023-24 season) 09/01/2023    Medicare Annual Wellness Visit (AWV)  04/26/2024    Diabetes: Hemoglobin A1C  07/19/2024    TSH Level  04/19/2025    Lipid Panel  04/19/2025    Diabetes: Urine Protein Screening  04/19/2025    Thyroglobulin Test  04/19/2025    DTaP/Tdap/Td Vaccines (2 - Td or Tdap) 11/06/2025    Pneumococcal Vaccine: 65+ Years  Completed    Zoster Vaccines  Completed    Hepatitis C Screening  Completed    Bone Density Scan  Completed    Influenza Vaccine  Addressed    HIB Vaccines  Aged Out    Hepatitis B Vaccines  Aged Out    IPV Vaccines  Aged Out    Hepatitis A Vaccines  Aged Out    Meningococcal Vaccine  Aged Out    Rotavirus Vaccines  Aged Out    HPV Vaccines  Aged Out    Colorectal Cancer Screening  Discontinued    Irritable Bowel Syndrome  Discontinued       Advance Care Planning   Has a living will and POA.        Reviewed lab/testing results with the patient and provided patient education regarding the results.  Continue current medications as listed  Follow up in one year for next wellness visit.

## 2024-05-13 ENCOUNTER — OFFICE VISIT (OUTPATIENT)
Dept: PRIMARY CARE | Facility: CLINIC | Age: 79
End: 2024-05-13
Payer: MEDICARE

## 2024-05-13 VITALS
SYSTOLIC BLOOD PRESSURE: 118 MMHG | HEIGHT: 62 IN | BODY MASS INDEX: 32.94 KG/M2 | DIASTOLIC BLOOD PRESSURE: 70 MMHG | WEIGHT: 179 LBS | TEMPERATURE: 98 F | HEART RATE: 72 BPM

## 2024-05-13 DIAGNOSIS — E03.9 ACQUIRED HYPOTHYROIDISM: ICD-10-CM

## 2024-05-13 DIAGNOSIS — J06.9 UPPER RESPIRATORY TRACT INFECTION, UNSPECIFIED TYPE: ICD-10-CM

## 2024-05-13 DIAGNOSIS — Z00.00 ROUTINE GENERAL MEDICAL EXAMINATION AT HEALTH CARE FACILITY: ICD-10-CM

## 2024-05-13 DIAGNOSIS — Z00.00 MEDICARE ANNUAL WELLNESS VISIT, SUBSEQUENT: Primary | ICD-10-CM

## 2024-05-13 DIAGNOSIS — Z71.2 ENCOUNTER TO DISCUSS TEST RESULTS: ICD-10-CM

## 2024-05-13 PROCEDURE — 1157F ADVNC CARE PLAN IN RCRD: CPT | Performed by: FAMILY MEDICINE

## 2024-05-13 PROCEDURE — 1160F RVW MEDS BY RX/DR IN RCRD: CPT | Performed by: FAMILY MEDICINE

## 2024-05-13 PROCEDURE — 1036F TOBACCO NON-USER: CPT | Performed by: FAMILY MEDICINE

## 2024-05-13 PROCEDURE — 1158F ADVNC CARE PLAN TLK DOCD: CPT | Performed by: FAMILY MEDICINE

## 2024-05-13 PROCEDURE — G0439 PPPS, SUBSEQ VISIT: HCPCS | Performed by: FAMILY MEDICINE

## 2024-05-13 PROCEDURE — 1170F FXNL STATUS ASSESSED: CPT | Performed by: FAMILY MEDICINE

## 2024-05-13 PROCEDURE — 1123F ACP DISCUSS/DSCN MKR DOCD: CPT | Performed by: FAMILY MEDICINE

## 2024-05-13 PROCEDURE — 99213 OFFICE O/P EST LOW 20 MIN: CPT | Performed by: FAMILY MEDICINE

## 2024-05-13 PROCEDURE — 1159F MED LIST DOCD IN RCRD: CPT | Performed by: FAMILY MEDICINE

## 2024-05-13 RX ORDER — AZITHROMYCIN 250 MG/1
TABLET, FILM COATED ORAL DAILY
Qty: 6 TABLET | Refills: 0 | Status: SHIPPED | OUTPATIENT
Start: 2024-05-13 | End: 2024-05-18

## 2024-05-13 RX ORDER — AZITHROMYCIN 250 MG/1
TABLET, FILM COATED ORAL DAILY
Qty: 6 TABLET | Refills: 0 | Status: SHIPPED | OUTPATIENT
Start: 2024-05-13 | End: 2024-05-13 | Stop reason: WASHOUT

## 2024-05-13 RX ORDER — LEVOTHYROXINE SODIUM 50 UG/1
50 TABLET ORAL DAILY
Qty: 90 TABLET | Refills: 1 | Status: SHIPPED | OUTPATIENT
Start: 2024-05-13 | End: 2024-05-13

## 2024-05-13 RX ORDER — LEVOTHYROXINE SODIUM 50 UG/1
50 TABLET ORAL DAILY
Qty: 90 TABLET | Refills: 3 | Status: SHIPPED | OUTPATIENT
Start: 2024-05-13

## 2024-05-13 ASSESSMENT — ACTIVITIES OF DAILY LIVING (ADL)
BATHING: INDEPENDENT
TAKING_MEDICATION: INDEPENDENT
DRESSING: INDEPENDENT
MANAGING_FINANCES: INDEPENDENT
GROCERY_SHOPPING: INDEPENDENT
DOING_HOUSEWORK: INDEPENDENT

## 2024-05-13 ASSESSMENT — ENCOUNTER SYMPTOMS
DEPRESSION: 0
OCCASIONAL FEELINGS OF UNSTEADINESS: 1
LOSS OF SENSATION IN FEET: 0

## 2024-05-13 ASSESSMENT — PATIENT HEALTH QUESTIONNAIRE - PHQ9
SUM OF ALL RESPONSES TO PHQ9 QUESTIONS 1 AND 2: 0
2. FEELING DOWN, DEPRESSED OR HOPELESS: NOT AT ALL
1. LITTLE INTEREST OR PLEASURE IN DOING THINGS: NOT AT ALL

## 2024-05-13 NOTE — PROGRESS NOTES
"Subjective   Reason for Visit: Lien Begum is an 78 y.o. female here for a Medicare Wellness visit.          Reviewed all medications by prescribing practitioner or clinical pharmacist (such as prescriptions, OTCs, herbal therapies and supplements) and documented in the medical record.    HPI    Patient Care Team:  Nadira Keller MD as PCP - General (Family Medicine)  Nadira Keller MD as PCP - [facility] Attributed Provider     Review of Systems    Objective   Vitals:  /70 (BP Location: Left arm, Patient Position: Sitting, BP Cuff Size: Adult)   Pulse 72   Temp 36.7 °C (98 °F) (Temporal)   Ht 1.562 m (5' 1.5\")   Wt 81.2 kg (179 lb)   BMI 33.27 kg/m²       Physical Exam    Assessment/Plan   Problem List Items Addressed This Visit     Encounter to discuss test results    Current Assessment & Plan     Reviewed lab/testing results with the patient and provided patient education regarding the results.           Hypothyroidism    Relevant Medications    levothyroxine (Synthroid, Levoxyl) 50 mcg tablet    Medicare annual wellness visit, subsequent - Primary    Current Assessment & Plan     Age appropriate preventive measures reviewed and discussed.  Diet and exercise recommendations discussed.            Other Visit Diagnoses     Routine general medical examination at health care facility                 "

## 2024-06-28 DIAGNOSIS — E78.2 HYPERLIPEMIA, MIXED: ICD-10-CM

## 2024-06-28 RX ORDER — ATORVASTATIN CALCIUM 10 MG/1
10 TABLET, FILM COATED ORAL NIGHTLY
Qty: 90 TABLET | Refills: 1 | Status: SHIPPED | OUTPATIENT
Start: 2024-06-28

## 2024-07-22 ENCOUNTER — APPOINTMENT (OUTPATIENT)
Dept: ORTHOPEDIC SURGERY | Facility: CLINIC | Age: 79
End: 2024-07-22
Payer: MEDICARE

## 2024-07-22 ENCOUNTER — HOSPITAL ENCOUNTER (OUTPATIENT)
Dept: RADIOLOGY | Facility: CLINIC | Age: 79
Discharge: HOME | End: 2024-07-22
Payer: MEDICARE

## 2024-07-22 ENCOUNTER — OFFICE VISIT (OUTPATIENT)
Dept: ORTHOPEDIC SURGERY | Facility: CLINIC | Age: 79
End: 2024-07-22
Payer: MEDICARE

## 2024-07-22 DIAGNOSIS — Z96.651 STATUS POST TOTAL RIGHT KNEE REPLACEMENT: ICD-10-CM

## 2024-07-22 PROCEDURE — 73562 X-RAY EXAM OF KNEE 3: CPT | Mod: RT

## 2024-07-22 PROCEDURE — 1157F ADVNC CARE PLAN IN RCRD: CPT | Performed by: ORTHOPAEDIC SURGERY

## 2024-07-22 PROCEDURE — 1159F MED LIST DOCD IN RCRD: CPT | Performed by: ORTHOPAEDIC SURGERY

## 2024-07-22 PROCEDURE — 99213 OFFICE O/P EST LOW 20 MIN: CPT | Performed by: ORTHOPAEDIC SURGERY

## 2024-07-22 PROCEDURE — 73562 X-RAY EXAM OF KNEE 3: CPT | Mod: RIGHT SIDE | Performed by: ORTHOPAEDIC SURGERY

## 2024-07-22 NOTE — PROGRESS NOTES
History of Present Illness:  DOS: 07/26/23 right total knee arthroplasty.  She states that she is doing well with no complaints.      Review of Systems:  GENERAL: Negative for malaise, significant weight loss, fever  MUSCULOSKELETAL: see HPI  NEURO:  Negative     Physical Exam:  General appearance well-nourished well-developed in no x3 no acute distress  Right knee:  Flexion to 110  5/5 quad strength    Imaging:  Well aligned total knee replacement.       Assessment:  Status post right total knee replacement     Plan:  Can return to full activities, no restrictions  Follow-up in 2 years for repeat x-rays    Past Medical History:   Diagnosis Date    Other specified health status     No pertinent past medical history    Personal history of other endocrine, nutritional and metabolic disease     History of diabetes mellitus    Personal history of other specified conditions     History of chest pain    Personal history of other specified conditions     History of balance disorder    Personal history of urinary (tract) infections 03/28/2022    History of cystitis    Personal history of urinary (tract) infections 05/16/2022    History of urinary tract infection    Unspecified osteoarthritis, unspecified site     Osteoarthritis       Medication Documentation Review Audit       Reviewed by Nadira Keller MD (Physician) on 05/13/24 at 1127      Medication Order Taking? Sig Documenting Provider Last Dose Status   atorvastatin (Lipitor) 10 mg tablet [id number] Yes TAKE 1 TABLET BY MOUTH EVERYDAY AT BEDTIME Lyndon Jaramillo MD Taking Active   calcium-magnesium-zinc 333-133-5 mg tablet [id number] Yes Take 1 tablet by mouth once daily. Historical Provider, MD Taking Active   Discontinued 05/13/24 1127   levothyroxine (Synthroid, Levoxyl) 50 mcg tablet [id number]  Take 1 tablet (50 mcg) by mouth once daily. Nadira Keller MD  Active   loratadine (Claritin Liqui-Gel) 10 mg capsule [id number] Yes Take 1 capsule by  mouth once daily. Historical Provider, MD Taking Active   mirabegron (Mybetriq) 25 mg tablet extended release 24 hr 24 hr tablet [id number] Yes Take 1 tablet (25 mg) by mouth once daily. Aneudy Melendez MD Taking Active   mirabegron (Mybetriq) 25 mg tablet extended release 24 hr 24 hr tablet [id number]  Take 1 tablet (25 mg) by mouth once daily. Aneudy Melendez MD  Flag for Review   multivit-min/ferrous fumarate (MULTI VITAMIN ORAL) [id number] Yes Take 1 tablet by mouth once daily. Historical Provider, MD Taking Active                    Allergies   Allergen Reactions    Simvastatin Unknown    Sulfamethoxazole Other     Muscle pain and weakness        Social History     Socioeconomic History    Marital status: Single     Spouse name: Not on file    Number of children: Not on file    Years of education: Not on file    Highest education level: Not on file   Occupational History    Not on file   Tobacco Use    Smoking status: Former     Types: Cigarettes    Smokeless tobacco: Never   Vaping Use    Vaping status: Never Used   Substance and Sexual Activity    Alcohol use: Yes     Comment: couple beers a week    Drug use: Never    Sexual activity: Defer   Other Topics Concern    Not on file   Social History Narrative    Not on file     Social Determinants of Health     Financial Resource Strain: Not on file   Food Insecurity: Not on file   Transportation Needs: Not on file   Physical Activity: Not on file   Stress: Not on file   Social Connections: Not on file   Intimate Partner Violence: Not on file   Housing Stability: Not on file       Past Surgical History:   Procedure Laterality Date    OTHER SURGICAL HISTORY  10/26/2021    Carpal tunnel surgery    OTHER SURGICAL HISTORY  10/26/2021    Foot surgery    OTHER SURGICAL HISTORY  10/26/2021    Complete colonoscopy    OTHER SURGICAL HISTORY  02/28/2022    Subtotal thyroidectomy    OTHER SURGICAL HISTORY  09/09/2020    Back surgery    OTHER SURGICAL HISTORY  09/09/2020     Cyst excision    OTHER SURGICAL HISTORY  09/09/2020    Colonoscopy    OTHER SURGICAL HISTORY  09/09/2020    [place] tooth extraction    TOTAL KNEE ARTHROPLASTY Right        Scribe Attestation  By signing my name below, I, Paco Cohen   attest that this documentation has been prepared under the direction and in the presence of David Ramon MD.

## 2024-08-26 DIAGNOSIS — N32.81 OAB (OVERACTIVE BLADDER): ICD-10-CM

## 2024-08-26 RX ORDER — MIRABEGRON 25 MG/1
25 TABLET, FILM COATED, EXTENDED RELEASE ORAL DAILY
Qty: 90 TABLET | Refills: 2 | Status: SHIPPED | OUTPATIENT
Start: 2024-08-26

## 2024-10-16 DIAGNOSIS — D64.9 ANEMIA, UNSPECIFIED TYPE: ICD-10-CM

## 2024-10-16 DIAGNOSIS — E11.65 TYPE 2 DIABETES MELLITUS WITH HYPERGLYCEMIA, WITHOUT LONG-TERM CURRENT USE OF INSULIN: Primary | ICD-10-CM

## 2024-10-18 ENCOUNTER — LAB (OUTPATIENT)
Dept: LAB | Facility: LAB | Age: 79
End: 2024-10-18
Payer: MEDICARE

## 2024-10-18 DIAGNOSIS — E11.65 TYPE 2 DIABETES MELLITUS WITH HYPERGLYCEMIA, WITHOUT LONG-TERM CURRENT USE OF INSULIN: ICD-10-CM

## 2024-10-18 DIAGNOSIS — D64.9 ANEMIA, UNSPECIFIED TYPE: ICD-10-CM

## 2024-10-18 LAB
ERYTHROCYTE [DISTWIDTH] IN BLOOD BY AUTOMATED COUNT: 13.3 % (ref 11.5–14.5)
EST. AVERAGE GLUCOSE BLD GHB EST-MCNC: 111 MG/DL
HBA1C MFR BLD: 5.5 %
HCT VFR BLD AUTO: 37.5 % (ref 36–46)
HGB BLD-MCNC: 12.6 G/DL (ref 12–16)
MCH RBC QN AUTO: 31 PG (ref 26–34)
MCHC RBC AUTO-ENTMCNC: 33.6 G/DL (ref 32–36)
MCV RBC AUTO: 92 FL (ref 80–100)
NRBC BLD-RTO: 0 /100 WBCS (ref 0–0)
PLATELET # BLD AUTO: 236 X10*3/UL (ref 150–450)
RBC # BLD AUTO: 4.06 X10*6/UL (ref 4–5.2)
WBC # BLD AUTO: 5.3 X10*3/UL (ref 4.4–11.3)

## 2024-10-18 PROCEDURE — 36415 COLL VENOUS BLD VENIPUNCTURE: CPT

## 2024-10-18 PROCEDURE — 83036 HEMOGLOBIN GLYCOSYLATED A1C: CPT

## 2024-10-18 PROCEDURE — 85027 COMPLETE CBC AUTOMATED: CPT

## 2024-11-01 DIAGNOSIS — E78.2 HYPERLIPEMIA, MIXED: ICD-10-CM

## 2024-11-05 RX ORDER — ATORVASTATIN CALCIUM 10 MG/1
10 TABLET, FILM COATED ORAL NIGHTLY
Qty: 90 TABLET | Refills: 3 | Status: SHIPPED | OUTPATIENT
Start: 2024-11-05

## 2024-11-10 NOTE — PROGRESS NOTES
"Subjective   Chief complaint: Lien Begum \"Teresita\" is a 79 y.o. female who presents for Follow-up (Patient is in office today for a 6 month follow-up).    HPI:  Here for a follow up appointment.  Recently had labs done and would like to review the results.  Noticed that her thyroid lab was not done, however, informed patient that her thyroid lab was done in April 2024 and was normal.  Will be on her next lab test.  No concerns with her medications.  She will need some refills.  Patient would also like her mammogram order printed.  No other changes in her history at this time.  No new concerns.  Reviewed smoking history.  Quit 2019 after \"50 years\" of smoking.  No respiratory complaints today.        Objective   /66 (BP Location: Left arm, Patient Position: Sitting, BP Cuff Size: Large adult)   Pulse 67   Temp 36.5 °C (97.7 °F) (Temporal)   Ht 1.562 m (5' 1.5\")   Wt 83.6 kg (184 lb 3.2 oz)   SpO2 97% Comment: RA  BMI 34.24 kg/m²   Physical Exam  General:  Alert, oriented, no acute distress  Eyes:  Sclerae white, PER, conjunctivae clear  ENT:  No nasal congestion.    Neck: Supple  Endocrine:  Last A1c was 5.5%.  Respiratory:  Normal breath sounds.  No wheezing, rhonchi nor crackles.  No dyspnea.  Cardiovascular:  S1 and S2 positive.  Regular rate and rhythm.  No gallops.  No murmurs.  Vascular:  No edema.  Skin warm and dry.  CNS:  No gross neurological deficits.  Gait within normal limits.    Psychiatric:  Affect is positive and appropriate.  No depression.  No anxiety.    Review of Systems   I have reviewed and reconciled the medication list with the patient today.   Current Outpatient Medications:     atorvastatin (Lipitor) 10 mg tablet, TAKE 1 TABLET BY MOUTH ONCE  DAILY AT BEDTIME, Disp: 90 tablet, Rfl: 3    calcium-magnesium-zinc 333-133-5 mg tablet, Take 1 tablet by mouth once daily., Disp: , Rfl:     levothyroxine (Synthroid, Levoxyl) 50 mcg tablet, Take 1 tablet (50 mcg) by mouth once daily., " Disp: 90 tablet, Rfl: 3    loratadine (Claritin Liqui-Gel) 10 mg capsule, Take 1 capsule by mouth once daily., Disp: , Rfl:     multivit-min/ferrous fumarate (MULTI VITAMIN ORAL), Take 1 tablet by mouth once daily., Disp: , Rfl:     mirabegron (Mybetriq) 25 mg tablet extended release 24 hr 24 hr tablet, Take 1 tablet (25 mg) by mouth once daily., Disp: 90 tablet, Rfl: 2     Imaging:  No results found.     Labs reviewed:    Lab Results   Component Value Date    WBC 5.3 10/18/2024    HGB 12.6 10/18/2024    HCT 37.5 10/18/2024     10/18/2024    CHOL 174 04/19/2024    TRIG 125 04/19/2024    HDL 62.0 04/19/2024    ALT 12 04/19/2024    AST 17 04/19/2024     04/19/2024    K 4.6 04/19/2024     04/19/2024    CREATININE 0.96 04/19/2024    BUN 28 (H) 04/19/2024    CO2 26 04/19/2024    TSH 2.74 04/19/2024    INR 1.0 07/10/2023    HGBA1C 5.5 10/18/2024       Assessment/Plan   Problem List Items Addressed This Visit       Diet-controlled diabetes mellitus (Multi) - Primary     Well-controlled.  A1c is 5.5% (October 2024).         Encounter for medication refill     Refills given. Medication reviewed.         Encounter to discuss test results     Reviewed lab/testing results with the patient and provided patient education regarding the results.           History of cigarette smoking     Low dose screening lung CT ordered.         Relevant Orders    CT lung screening low dose    Hypothyroidism     Last lab is normal.  Lab order on chart for next year.         Relevant Orders    TSH with reflex to Free T4 if abnormal    Mixed hyperlipidemia     Lab order on chart.          Relevant Orders    Comprehensive Metabolic Panel    Lipid Panel     Other Visit Diagnoses       OAB (overactive bladder)        Relevant Medications    mirabegron (Mybetriq) 25 mg tablet extended release 24 hr 24 hr tablet    BMI 34.0-34.9,adult        Relevant Orders    Vitamin D 25-Hydroxy,Total (for eval of Vitamin D levels)    Healthcare  maintenance        Relevant Orders    CBC and Auto Differential    CT lung screening low dose    History of anemia        Relevant Orders    CBC and Auto Differential    Breast cancer screening by mammogram        Relevant Orders    BI mammo bilateral screening tomosynthesis            Continue current medications as listed  Follow up with the wellness visit.  Sooner if needed.

## 2024-11-11 ENCOUNTER — APPOINTMENT (OUTPATIENT)
Dept: PRIMARY CARE | Facility: CLINIC | Age: 79
End: 2024-11-11
Payer: MEDICARE

## 2024-11-11 VITALS
HEART RATE: 67 BPM | DIASTOLIC BLOOD PRESSURE: 66 MMHG | TEMPERATURE: 97.7 F | HEIGHT: 62 IN | BODY MASS INDEX: 33.9 KG/M2 | WEIGHT: 184.2 LBS | SYSTOLIC BLOOD PRESSURE: 126 MMHG | OXYGEN SATURATION: 97 %

## 2024-11-11 DIAGNOSIS — E78.2 MIXED HYPERLIPIDEMIA: ICD-10-CM

## 2024-11-11 DIAGNOSIS — Z00.00 HEALTHCARE MAINTENANCE: ICD-10-CM

## 2024-11-11 DIAGNOSIS — E11.65 TYPE 2 DIABETES MELLITUS WITH HYPERGLYCEMIA, WITHOUT LONG-TERM CURRENT USE OF INSULIN: Primary | ICD-10-CM

## 2024-11-11 DIAGNOSIS — Z76.0 ENCOUNTER FOR MEDICATION REFILL: ICD-10-CM

## 2024-11-11 DIAGNOSIS — Z87.891 HISTORY OF CIGARETTE SMOKING: ICD-10-CM

## 2024-11-11 DIAGNOSIS — E11.9 DIET-CONTROLLED DIABETES MELLITUS (MULTI): ICD-10-CM

## 2024-11-11 DIAGNOSIS — Z86.2 HISTORY OF ANEMIA: ICD-10-CM

## 2024-11-11 DIAGNOSIS — Z71.2 ENCOUNTER TO DISCUSS TEST RESULTS: ICD-10-CM

## 2024-11-11 DIAGNOSIS — Z12.31 BREAST CANCER SCREENING BY MAMMOGRAM: ICD-10-CM

## 2024-11-11 DIAGNOSIS — N32.81 OAB (OVERACTIVE BLADDER): ICD-10-CM

## 2024-11-11 DIAGNOSIS — E03.9 ACQUIRED HYPOTHYROIDISM: ICD-10-CM

## 2024-11-11 PROCEDURE — 1159F MED LIST DOCD IN RCRD: CPT | Performed by: FAMILY MEDICINE

## 2024-11-11 PROCEDURE — 1158F ADVNC CARE PLAN TLK DOCD: CPT | Performed by: FAMILY MEDICINE

## 2024-11-11 PROCEDURE — 1157F ADVNC CARE PLAN IN RCRD: CPT | Performed by: FAMILY MEDICINE

## 2024-11-11 PROCEDURE — 1123F ACP DISCUSS/DSCN MKR DOCD: CPT | Performed by: FAMILY MEDICINE

## 2024-11-11 PROCEDURE — 99214 OFFICE O/P EST MOD 30 MIN: CPT | Performed by: FAMILY MEDICINE

## 2024-11-11 PROCEDURE — 1036F TOBACCO NON-USER: CPT | Performed by: FAMILY MEDICINE

## 2024-11-11 RX ORDER — MIRABEGRON 25 MG/1
25 TABLET, FILM COATED, EXTENDED RELEASE ORAL DAILY
Qty: 90 TABLET | Refills: 2 | Status: SHIPPED | OUTPATIENT
Start: 2024-11-11

## 2024-12-30 ENCOUNTER — ANCILLARY PROCEDURE (OUTPATIENT)
Facility: HOSPITAL | Age: 79
End: 2024-12-30
Payer: MEDICARE

## 2024-12-30 DIAGNOSIS — Z00.00 HEALTHCARE MAINTENANCE: ICD-10-CM

## 2024-12-30 DIAGNOSIS — Z87.891 PERSONAL HISTORY OF NICOTINE DEPENDENCE: ICD-10-CM

## 2024-12-30 DIAGNOSIS — Z87.891 HISTORY OF CIGARETTE SMOKING: ICD-10-CM

## 2024-12-30 PROCEDURE — 71271 CT THORAX LUNG CANCER SCR C-: CPT

## 2024-12-30 PROCEDURE — 71271 CT THORAX LUNG CANCER SCR C-: CPT | Performed by: RADIOLOGY

## 2025-02-12 ENCOUNTER — APPOINTMENT (OUTPATIENT)
Dept: PRIMARY CARE | Facility: CLINIC | Age: 80
End: 2025-02-12
Payer: MEDICARE

## 2025-02-12 VITALS
BODY MASS INDEX: 34.46 KG/M2 | WEIGHT: 185.4 LBS | HEART RATE: 77 BPM | OXYGEN SATURATION: 93 % | TEMPERATURE: 97 F | SYSTOLIC BLOOD PRESSURE: 128 MMHG | DIASTOLIC BLOOD PRESSURE: 78 MMHG

## 2025-02-12 DIAGNOSIS — E03.9 ACQUIRED HYPOTHYROIDISM: ICD-10-CM

## 2025-02-12 DIAGNOSIS — E78.2 MIXED HYPERLIPIDEMIA: ICD-10-CM

## 2025-02-12 DIAGNOSIS — Z71.2 ENCOUNTER TO DISCUSS TEST RESULTS: ICD-10-CM

## 2025-02-12 DIAGNOSIS — E11.9 DIET-CONTROLLED DIABETES MELLITUS (MULTI): ICD-10-CM

## 2025-02-12 DIAGNOSIS — R93.1 ELEVATED CORONARY ARTERY CALCIUM SCORE: Primary | ICD-10-CM

## 2025-02-12 PROCEDURE — 1160F RVW MEDS BY RX/DR IN RCRD: CPT | Performed by: FAMILY MEDICINE

## 2025-02-12 PROCEDURE — 1036F TOBACCO NON-USER: CPT | Performed by: FAMILY MEDICINE

## 2025-02-12 PROCEDURE — 1157F ADVNC CARE PLAN IN RCRD: CPT | Performed by: FAMILY MEDICINE

## 2025-02-12 PROCEDURE — 1123F ACP DISCUSS/DSCN MKR DOCD: CPT | Performed by: FAMILY MEDICINE

## 2025-02-12 PROCEDURE — 99214 OFFICE O/P EST MOD 30 MIN: CPT | Performed by: FAMILY MEDICINE

## 2025-02-12 PROCEDURE — 1158F ADVNC CARE PLAN TLK DOCD: CPT | Performed by: FAMILY MEDICINE

## 2025-02-12 PROCEDURE — 1159F MED LIST DOCD IN RCRD: CPT | Performed by: FAMILY MEDICINE

## 2025-02-12 NOTE — ASSESSMENT & PLAN NOTE
Reviewed lab/testing results with the patient and provided patient education regarding the results.

## 2025-02-12 NOTE — PROGRESS NOTES
"Subjective   Chief complaint: Lien Begum \"Teresita\" is a 79 y.o. female who presents for Results (Patient is here today to go over lab results ).    HPI:  Lien is here today to review testing results.  She had a screening lung CT and would like to review the results together.  She had an order for labs on her chart from November which were not yet done.  New order can be printed.  No concerns with medications.  No refills needed at this time.  She has been feeling fine overall.  Last A1c was October 18, 2024 and was 5.5%.  No other changes in her history today.        Objective   /78 (BP Location: Right arm, Patient Position: Sitting, BP Cuff Size: Adult)   Pulse 77   Temp 36.1 °C (97 °F) (Temporal)   Wt 84.1 kg (185 lb 6.4 oz)   SpO2 93% Comment: LAUREL  BMI 34.46 kg/m²   Physical Exam  General:  Alert, oriented, no acute distress  Eyes:  Sclerae white, PER, conjunctivae clear  ENT:  No nasal congestion.    Neck: Supple  Endocrine:  Last A1c was 10/18/24 was 5.5%.  Respiratory:  Normal breath sounds.  No wheezing, rhonchi nor crackles.  No dyspnea.  Cardiovascular:  S1 and S2 positive.  Regular rate and rhythm.  No gallops.  No murmurs.  Vascular:  No edema.  Skin warm and dry.  CNS:  No gross neurological deficits.  Gait within normal limits.    Psychiatric:  Affect is positive and appropriate.  No depression.  No anxiety.    Review of Systems   I have reviewed and reconciled the medication list with the patient today.   Current Outpatient Medications:     atorvastatin (Lipitor) 10 mg tablet, TAKE 1 TABLET BY MOUTH ONCE  DAILY AT BEDTIME, Disp: 90 tablet, Rfl: 3    calcium-magnesium-zinc 333-133-5 mg tablet, Take 1 tablet by mouth once daily., Disp: , Rfl:     levothyroxine (Synthroid, Levoxyl) 50 mcg tablet, Take 1 tablet (50 mcg) by mouth once daily., Disp: 90 tablet, Rfl: 3    loratadine (Claritin Liqui-Gel) 10 mg capsule, Take 1 capsule by mouth once daily., Disp: , Rfl:     mirabegron (Mybetriq) " 25 mg tablet extended release 24 hr 24 hr tablet, Take 1 tablet (25 mg) by mouth once daily., Disp: 90 tablet, Rfl: 2    multivit-min/ferrous fumarate (MULTI VITAMIN ORAL), Take 1 tablet by mouth once daily., Disp: , Rfl:      Imaging:  No results found.     Labs reviewed:    Lab Results   Component Value Date    WBC 5.3 10/18/2024    HGB 12.6 10/18/2024    HCT 37.5 10/18/2024     10/18/2024    CHOL 174 04/19/2024    TRIG 125 04/19/2024    HDL 62.0 04/19/2024    ALT 12 04/19/2024    AST 17 04/19/2024     04/19/2024    K 4.6 04/19/2024     04/19/2024    CREATININE 0.96 04/19/2024    BUN 28 (H) 04/19/2024    CO2 26 04/19/2024    TSH 2.74 04/19/2024    INR 1.0 07/10/2023    HGBA1C 5.5 10/18/2024       Assessment/Plan   Problem List Items Addressed This Visit       Diet-controlled diabetes mellitus (Multi)     Lab order given.         Relevant Orders    Comprehensive Metabolic Panel    Albumin-Creatinine Ratio, Urine Random    Hemoglobin A1C    Elevated coronary artery calcium score - Primary     Discussed score and relevance.   Discussed heart healthy behaviors.  Will monitor.         Encounter to discuss test results     Reviewed lab/testing results with the patient and provided patient education regarding the results.         Hypothyroidism     Lab order given.         Relevant Orders    CBC and Auto Differential    TSH with reflex to Free T4 if abnormal    Mixed hyperlipidemia     Lab order given.         Relevant Orders    Comprehensive Metabolic Panel    Lipid Panel     Other Visit Diagnoses       BMI 34.0-34.9,adult        Relevant Orders    Vitamin D 25-Hydroxy,Total (for eval of Vitamin D levels)            Continue current medications as listed  Follow up in 3-6 months.

## 2025-02-21 ENCOUNTER — HOSPITAL ENCOUNTER (OUTPATIENT)
Dept: WOMENS IMAGING | Age: 80
Discharge: HOME | End: 2025-02-23
Payer: MEDICARE

## 2025-02-21 VITALS — BODY MASS INDEX: 33.84 KG/M2 | HEIGHT: 61 IN

## 2025-02-21 DIAGNOSIS — Z12.31 BREAST CANCER SCREENING BY MAMMOGRAM: ICD-10-CM

## 2025-02-21 PROCEDURE — 77063 BREAST TOMOSYNTHESIS BI: CPT

## 2025-03-11 ENCOUNTER — OFFICE VISIT (OUTPATIENT)
Dept: ORTHOPEDIC SURGERY | Facility: CLINIC | Age: 80
End: 2025-03-11
Payer: MEDICARE

## 2025-03-11 ENCOUNTER — HOSPITAL ENCOUNTER (OUTPATIENT)
Dept: RADIOLOGY | Facility: CLINIC | Age: 80
Discharge: HOME | End: 2025-03-11
Payer: MEDICARE

## 2025-03-11 DIAGNOSIS — Z96.651 STATUS POST TOTAL RIGHT KNEE REPLACEMENT: ICD-10-CM

## 2025-03-11 PROCEDURE — 73562 X-RAY EXAM OF KNEE 3: CPT | Mod: RT

## 2025-03-11 PROCEDURE — 99214 OFFICE O/P EST MOD 30 MIN: CPT | Performed by: ORTHOPAEDIC SURGERY

## 2025-03-11 PROCEDURE — L1812 KO ELASTIC W/JOINTS PRE OTS: HCPCS | Performed by: ORTHOPAEDIC SURGERY

## 2025-03-11 PROCEDURE — 1157F ADVNC CARE PLAN IN RCRD: CPT | Performed by: ORTHOPAEDIC SURGERY

## 2025-03-11 PROCEDURE — 1123F ACP DISCUSS/DSCN MKR DOCD: CPT | Performed by: ORTHOPAEDIC SURGERY

## 2025-03-11 PROCEDURE — 73562 X-RAY EXAM OF KNEE 3: CPT | Mod: RIGHT SIDE | Performed by: RADIOLOGY

## 2025-03-11 RX ORDER — IBUPROFEN 800 MG/1
800 TABLET ORAL EVERY 8 HOURS PRN
Qty: 90 TABLET | Refills: 1 | Status: SHIPPED | OUTPATIENT
Start: 2025-03-11

## 2025-03-11 RX ORDER — METHYLPREDNISOLONE 4 MG/1
TABLET ORAL
Qty: 21 TABLET | Refills: 0 | Status: SHIPPED | OUTPATIENT
Start: 2025-03-11

## 2025-03-11 NOTE — PROGRESS NOTES
History of Present Illness:  DOS: 07/26/23 right total knee arthroplasty.  She returns with some soreness in the quad.     Imaging:  X-rays right knee: Well aligned total knee replacement.       Assessment:  Status post right total knee replacement     Plan:  Medrol Dosepak  Ibuprofen  Ice/heat: 60 minutes consistently  Knee brace  Home exercise handout given today.   Follow-up in 4 weeks. If no better we will get repeat x-rays.     Physical Exam:  General appearance well-nourished well-developed in no x3 no acute distress  Right knee:  Flexion to 120  5/5 quad strength  She has point tenderness at the quad.    Review of Systems:  GENERAL: Negative for malaise, significant weight loss, fever  MUSCULOSKELETAL: see HPI  NEURO:  Negative    Past Medical History:   Diagnosis Date    Arthritis 2007    Disease of thyroid gland 2014    Other specified health status     No pertinent past medical history    Personal history of other endocrine, nutritional and metabolic disease     History of diabetes mellitus    Personal history of other specified conditions     History of chest pain    Personal history of other specified conditions     History of balance disorder    Personal history of urinary (tract) infections 03/28/2022    History of cystitis    Personal history of urinary (tract) infections 05/16/2022    History of urinary tract infection    Unspecified osteoarthritis, unspecified site     Osteoarthritis    Urinary tract infection 2020    Varicella 1954       Medication Documentation Review Audit       Reviewed by Nadira Keller MD (Physician) on 02/12/25 at 1247      Medication Order Taking? Sig Documenting Provider Last Dose Status   atorvastatin (Lipitor) 10 mg tablet [id number] Yes TAKE 1 TABLET BY MOUTH ONCE  DAILY AT BEDTIME Nadira Keller MD  Active   calcium-magnesium-zinc 333-133-5 mg tablet [id number] Yes Take 1 tablet by mouth once daily. Historical Provider, MD Taking Active    levothyroxine (Synthroid, Levoxyl) 50 mcg tablet [id number] Yes Take 1 tablet (50 mcg) by mouth once daily. Nadira Keller MD  Active   loratadine (Claritin Liqui-Gel) 10 mg capsule [id number] Yes Take 1 capsule by mouth once daily. Historical Provider, MD Taking Active   mirabegron (Mybetriq) 25 mg tablet extended release 24 hr 24 hr tablet [id number] Yes Take 1 tablet (25 mg) by mouth once daily. Nadira Keller MD  Active   multivit-min/ferrous fumarate (MULTI VITAMIN ORAL) [id number] Yes Take 1 tablet by mouth once daily. Historical Provider, MD Taking Active                    Allergies   Allergen Reactions    Simvastatin Unknown    Sulfamethoxazole Other     Muscle pain and weakness        Social History     Socioeconomic History    Marital status: Single     Spouse name: Not on file    Number of children: Not on file    Years of education: Not on file    Highest education level: Not on file   Occupational History    Not on file   Tobacco Use    Smoking status: Former     Current packs/day: 0.00     Average packs/day: 1 pack/day for 56.5 years (56.5 ttl pk-yrs)     Types: Cigarettes     Start date: 6/24/1965     Quit date: 2022     Years since quitting: 3.1    Smokeless tobacco: Never   Vaping Use    Vaping status: Never Used   Substance and Sexual Activity    Alcohol use: Yes     Alcohol/week: 6.0 standard drinks of alcohol     Types: 6 Cans of beer per week     Comment: couple beers a week    Drug use: Never    Sexual activity: Not Currently     Partners: Female, Male     Birth control/protection: None   Other Topics Concern    Not on file   Social History Narrative    Not on file     Social Drivers of Health     Financial Resource Strain: Not on file   Food Insecurity: Not on file   Transportation Needs: Not on file   Physical Activity: Not on file   Stress: Not on file   Social Connections: Not on file   Intimate Partner Violence: Not on file   Housing Stability: Not on file       Past  Surgical History:   Procedure Laterality Date    BACK SURGERY  2018    JOINT REPLACEMENT  07/26/2023    OTHER SURGICAL HISTORY  10/26/2021    Carpal tunnel surgery    OTHER SURGICAL HISTORY  10/26/2021    Foot surgery    OTHER SURGICAL HISTORY  10/26/2021    Complete colonoscopy    OTHER SURGICAL HISTORY  02/28/2022    Subtotal thyroidectomy    OTHER SURGICAL HISTORY  09/09/2020    Back surgery    OTHER SURGICAL HISTORY  09/09/2020    Cyst excision    OTHER SURGICAL HISTORY  09/09/2020    Colonoscopy    OTHER SURGICAL HISTORY  09/09/2020    [place] tooth extraction    SPINE SURGERY  2015    TOTAL KNEE ARTHROPLASTY Right        Scribe Attestation  By signing my name below, I, Paco Cohen   attest that this documentation has been prepared under the direction and in the presence of David Ramon MD.

## 2025-04-03 DIAGNOSIS — E03.9 ACQUIRED HYPOTHYROIDISM: ICD-10-CM

## 2025-04-03 NOTE — PROGRESS NOTES
History of Present Illness:  DOS: 07/26/23 right total knee arthroplasty. Her knee is doing better after the oral steroids and anti-inflammatories. The soreness in her quad is doing well.     Imaging:  X-rays right knee: Well aligned total knee replacement.       Assessment:  Status post right total knee replacement     Plan:  Switched her to Naproxen  Ice/heat: 60 minutes consistently  Knee brace  Home exercise handout given today.   Formal therapy at [facility].   Follow-up in 6 weeks if needed     Physical Exam:  General appearance well-nourished well-developed in no x3 no acute distress  Right knee:  Flexion to 120  5/5 quad strength  She has point tenderness at the quad.    Review of Systems:  GENERAL: Negative for malaise, significant weight loss, fever  MUSCULOSKELETAL: see HPI  NEURO:  Negative    Past Medical History:   Diagnosis Date    Arthritis 2007    Disease of thyroid gland 2014    Other specified health status     No pertinent past medical history    Personal history of other endocrine, nutritional and metabolic disease     History of diabetes mellitus    Personal history of other specified conditions     History of chest pain    Personal history of other specified conditions     History of balance disorder    Personal history of urinary (tract) infections 03/28/2022    History of cystitis    Personal history of urinary (tract) infections 05/16/2022    History of urinary tract infection    Unspecified osteoarthritis, unspecified site     Osteoarthritis    Urinary tract infection 2020    Varicella 1954       Medication Documentation Review Audit       Reviewed by Nadira Keller MD (Physician) on 02/12/25 at 1247      Medication Order Taking? Sig Documenting Provider Last Dose Status   atorvastatin (Lipitor) 10 mg tablet [id number] Yes TAKE 1 TABLET BY MOUTH ONCE  DAILY AT BEDTIME Nadira Keller MD  Active   calcium-magnesium-zinc 333-133-5 mg tablet [id number] Yes Take 1 tablet by  mouth once daily. Historical Provider, MD Taking Active   levothyroxine (Synthroid, Levoxyl) 50 mcg tablet [id number] Yes Take 1 tablet (50 mcg) by mouth once daily. Nadira Keller MD  Active   loratadine (Claritin Liqui-Gel) 10 mg capsule [id number] Yes Take 1 capsule by mouth once daily. Historical Provider, MD Taking Active   mirabegron (Mybetriq) 25 mg tablet extended release 24 hr 24 hr tablet [id number] Yes Take 1 tablet (25 mg) by mouth once daily. Nadira Keller MD  Active   multivit-min/ferrous fumarate (MULTI VITAMIN ORAL) [id number] Yes Take 1 tablet by mouth once daily. Historical Provider, MD Taking Active                    Allergies   Allergen Reactions    Simvastatin Unknown    Sulfamethoxazole Other     Muscle pain and weakness        Social History     Socioeconomic History    Marital status: Single     Spouse name: Not on file    Number of children: Not on file    Years of education: Not on file    Highest education level: Not on file   Occupational History    Not on file   Tobacco Use    Smoking status: Former     Current packs/day: 0.00     Average packs/day: 1 pack/day for 56.5 years (56.5 ttl pk-yrs)     Types: Cigarettes     Start date: 6/24/1965     Quit date: 2022     Years since quitting: 3.2    Smokeless tobacco: Never   Vaping Use    Vaping status: Never Used   Substance and Sexual Activity    Alcohol use: Yes     Alcohol/week: 6.0 standard drinks of alcohol     Types: 6 Cans of beer per week     Comment: couple beers a week    Drug use: Never    Sexual activity: Not Currently     Partners: Female, Male     Birth control/protection: None   Other Topics Concern    Not on file   Social History Narrative    Not on file     Social Drivers of Health     Financial Resource Strain: Not on file   Food Insecurity: Not on file   Transportation Needs: Not on file   Physical Activity: Not on file   Stress: Not on file   Social Connections: Not on file   Intimate Partner Violence:  Not on file   Housing Stability: Not on file       Past Surgical History:   Procedure Laterality Date    BACK SURGERY  2018    JOINT REPLACEMENT  07/26/2023    OTHER SURGICAL HISTORY  10/26/2021    Carpal tunnel surgery    OTHER SURGICAL HISTORY  10/26/2021    Foot surgery    OTHER SURGICAL HISTORY  10/26/2021    Complete colonoscopy    OTHER SURGICAL HISTORY  02/28/2022    Subtotal thyroidectomy    OTHER SURGICAL HISTORY  09/09/2020    Back surgery    OTHER SURGICAL HISTORY  09/09/2020    Cyst excision    OTHER SURGICAL HISTORY  09/09/2020    Colonoscopy    OTHER SURGICAL HISTORY  09/09/2020    [place] tooth extraction    SPINE SURGERY  2015    TOTAL KNEE ARTHROPLASTY Right        Scribe Attestation  By signing my name below, I, Paco Cohen   attest that this documentation has been prepared under the direction and in the presence of David Ramon MD.

## 2025-04-04 ENCOUNTER — HOSPITAL ENCOUNTER (OUTPATIENT)
Dept: RADIOLOGY | Facility: CLINIC | Age: 80
Discharge: HOME | End: 2025-04-04
Payer: MEDICARE

## 2025-04-04 ENCOUNTER — OFFICE VISIT (OUTPATIENT)
Dept: ORTHOPEDIC SURGERY | Facility: CLINIC | Age: 80
End: 2025-04-04
Payer: MEDICARE

## 2025-04-04 DIAGNOSIS — Z96.651 STATUS POST TOTAL RIGHT KNEE REPLACEMENT: Primary | ICD-10-CM

## 2025-04-04 DIAGNOSIS — M25.561 RIGHT KNEE PAIN, UNSPECIFIED CHRONICITY: ICD-10-CM

## 2025-04-04 PROCEDURE — 99214 OFFICE O/P EST MOD 30 MIN: CPT | Performed by: ORTHOPAEDIC SURGERY

## 2025-04-04 PROCEDURE — 73562 X-RAY EXAM OF KNEE 3: CPT | Mod: RT

## 2025-04-04 RX ORDER — NAPROXEN 500 MG/1
500 TABLET ORAL 2 TIMES DAILY
Qty: 60 TABLET | Refills: 0 | Status: SHIPPED | OUTPATIENT
Start: 2025-04-04 | End: 2025-05-04

## 2025-04-04 RX ORDER — LEVOTHYROXINE SODIUM 50 UG/1
50 TABLET ORAL DAILY
Qty: 90 TABLET | Refills: 3 | Status: SHIPPED | OUTPATIENT
Start: 2025-04-04

## 2025-04-04 NOTE — TELEPHONE ENCOUNTER
Pharmacy request     11/11/2024    Future Appointments       Date / Time Provider Department Dept Phone    4/4/2025 10:45 AM (Arrive by 10:30 AM) David Ramon MD [facility] [phone]    5/19/2025 11:00 AM Nadira Keller MD [facility] [phone]

## 2025-04-08 ENCOUNTER — APPOINTMENT (OUTPATIENT)
Dept: ORTHOPEDIC SURGERY | Facility: CLINIC | Age: 80
End: 2025-04-08
Payer: MEDICARE

## 2025-04-10 DIAGNOSIS — N32.81 OAB (OVERACTIVE BLADDER): ICD-10-CM

## 2025-04-10 RX ORDER — MIRABEGRON 50 MG/1
50 TABLET, FILM COATED, EXTENDED RELEASE ORAL DAILY
Qty: 90 TABLET | Refills: 1 | Status: SHIPPED | OUTPATIENT
Start: 2025-04-10

## 2025-04-17 LAB
25(OH)D3+25(OH)D2 SERPL-MCNC: 43 NG/ML (ref 30–100)
ALBUMIN SERPL-MCNC: 4.2 G/DL (ref 3.6–5.1)
ALP SERPL-CCNC: 54 U/L (ref 37–153)
ALT SERPL-CCNC: 13 U/L (ref 6–29)
ANION GAP SERPL CALCULATED.4IONS-SCNC: 9 MMOL/L (CALC) (ref 7–17)
AST SERPL-CCNC: 18 U/L (ref 10–35)
BASOPHILS # BLD AUTO: 40 CELLS/UL (ref 0–200)
BASOPHILS NFR BLD AUTO: 0.6 %
BILIRUB SERPL-MCNC: 0.5 MG/DL (ref 0.2–1.2)
BUN SERPL-MCNC: 33 MG/DL (ref 7–25)
CALCIUM SERPL-MCNC: 9.5 MG/DL (ref 8.6–10.4)
CHLORIDE SERPL-SCNC: 104 MMOL/L (ref 98–110)
CHOLEST SERPL-MCNC: 166 MG/DL
CHOLEST/HDLC SERPL: 2.9 (CALC)
CO2 SERPL-SCNC: 24 MMOL/L (ref 20–32)
CREAT SERPL-MCNC: 1.04 MG/DL (ref 0.6–1)
EGFRCR SERPLBLD CKD-EPI 2021: 55 ML/MIN/1.73M2
EOSINOPHIL # BLD AUTO: 221 CELLS/UL (ref 15–500)
EOSINOPHIL NFR BLD AUTO: 3.3 %
ERYTHROCYTE [DISTWIDTH] IN BLOOD BY AUTOMATED COUNT: 13.4 % (ref 11–15)
EST. AVERAGE GLUCOSE BLD GHB EST-MCNC: 123 MG/DL
EST. AVERAGE GLUCOSE BLD GHB EST-SCNC: 6.8 MMOL/L
GLUCOSE SERPL-MCNC: 92 MG/DL (ref 65–99)
HBA1C MFR BLD: 5.9 %
HCT VFR BLD AUTO: 37.6 % (ref 35–45)
HDLC SERPL-MCNC: 57 MG/DL
HGB BLD-MCNC: 12.2 G/DL (ref 11.7–15.5)
LDLC SERPL CALC-MCNC: 87 MG/DL (CALC)
LYMPHOCYTES # BLD AUTO: 1548 CELLS/UL (ref 850–3900)
LYMPHOCYTES NFR BLD AUTO: 23.1 %
MCH RBC QN AUTO: 30.1 PG (ref 27–33)
MCHC RBC AUTO-ENTMCNC: 32.4 G/DL (ref 32–36)
MCV RBC AUTO: 92.8 FL (ref 80–100)
MONOCYTES # BLD AUTO: 838 CELLS/UL (ref 200–950)
MONOCYTES NFR BLD AUTO: 12.5 %
NEUTROPHILS # BLD AUTO: 4054 CELLS/UL (ref 1500–7800)
NEUTROPHILS NFR BLD AUTO: 60.5 %
NONHDLC SERPL-MCNC: 109 MG/DL (CALC)
PLATELET # BLD AUTO: 257 THOUSAND/UL (ref 140–400)
PMV BLD REES-ECKER: 11 FL (ref 7.5–12.5)
POTASSIUM SERPL-SCNC: 4.7 MMOL/L (ref 3.5–5.3)
PROT SERPL-MCNC: 6.4 G/DL (ref 6.1–8.1)
RBC # BLD AUTO: 4.05 MILLION/UL (ref 3.8–5.1)
SODIUM SERPL-SCNC: 137 MMOL/L (ref 135–146)
TRIGL SERPL-MCNC: 121 MG/DL
TSH SERPL-ACNC: 2.84 MIU/L (ref 0.4–4.5)
WBC # BLD AUTO: 6.7 THOUSAND/UL (ref 3.8–10.8)

## 2025-04-18 LAB
ALBUMIN/CREAT UR: 5 MG/G CREAT
CREAT UR-MCNC: 39 MG/DL (ref 20–275)
MICROALBUMIN UR-MCNC: 0.2 MG/DL

## 2025-05-12 NOTE — PROGRESS NOTES
History of Present Illness:  DOS: 07/26/23 right total knee arthroplasty. Her knee is doing better after the oral steroids and anti-inflammatories. The soreness in her quad is doing well.     5/13: She reports some pain and soreness around the knee that radiates up.  Predominantly she states in the lateral patellar tendon.  Her quad type pain has gotten better    Imaging:  X-rays right knee: Well aligned total knee replacement.       Assessment:  Status post right total knee replacement, patellar tendinitis     Plan:  Medrol Dospak  Continue with Naproxen for a week after  Advised not to mix steroid and NSAIDs  Ice/heat: 60 minutes consistently  Knee brace  Home exercise handout given today.   Formal therapy at [facility].   Follow-up in 6 weeks if needed     Physical Exam:  General appearance well-nourished well-developed in no x3 no acute distress  Right knee:  Flexion to 120  5/5 quad strength  She has point tenderness at the quad.  Pain on patellar tendon    Review of Systems:  GENERAL: Negative for malaise, significant weight loss, fever  MUSCULOSKELETAL: see HPI  NEURO:  Negative    Past Medical History:   Diagnosis Date    Arthritis 2007    Disease of thyroid gland 2014    Other specified health status     No pertinent past medical history    Personal history of other endocrine, nutritional and metabolic disease     History of diabetes mellitus    Personal history of other specified conditions     History of chest pain    Personal history of other specified conditions     History of balance disorder    Personal history of urinary (tract) infections 03/28/2022    History of cystitis    Personal history of urinary (tract) infections 05/16/2022    History of urinary tract infection    Unspecified osteoarthritis, unspecified site     Osteoarthritis    Urinary tract infection 2020    Varicella 1954       Medication Documentation Review Audit       Reviewed by Nadira Keller MD (Physician) on 02/12/25  at 1247      Medication Order Taking? Sig Documenting Provider Last Dose Status   atorvastatin (Lipitor) 10 mg tablet [id number] Yes TAKE 1 TABLET BY MOUTH ONCE  DAILY AT BEDTIME Nadira Keller MD  Active   calcium-magnesium-zinc 333-133-5 mg tablet [id number] Yes Take 1 tablet by mouth once daily. Historical Provider, MD Taking Active   levothyroxine (Synthroid, Levoxyl) 50 mcg tablet [id number] Yes Take 1 tablet (50 mcg) by mouth once daily. Nadira Keller MD  Active   loratadine (Claritin Liqui-Gel) 10 mg capsule [id number] Yes Take 1 capsule by mouth once daily. Historical Provider, MD Taking Active   mirabegron (Mybetriq) 25 mg tablet extended release 24 hr 24 hr tablet [id number] Yes Take 1 tablet (25 mg) by mouth once daily. Nadira Keller MD  Active   multivit-min/ferrous fumarate (MULTI VITAMIN ORAL) [id number] Yes Take 1 tablet by mouth once daily. Historical Provider, MD Taking Active                    Allergies   Allergen Reactions    Simvastatin Unknown    Sulfamethoxazole Other     Muscle pain and weakness        Social History     Socioeconomic History    Marital status: Single     Spouse name: Not on file    Number of children: Not on file    Years of education: Not on file    Highest education level: Not on file   Occupational History    Not on file   Tobacco Use    Smoking status: Former     Current packs/day: 0.00     Average packs/day: 1 pack/day for 56.5 years (56.5 ttl pk-yrs)     Types: Cigarettes     Start date: 6/24/1965     Quit date: 2022     Years since quitting: 3.3    Smokeless tobacco: Never   Vaping Use    Vaping status: Never Used   Substance and Sexual Activity    Alcohol use: Yes     Alcohol/week: 6.0 standard drinks of alcohol     Types: 6 Cans of beer per week     Comment: couple beers a week    Drug use: Never    Sexual activity: Not Currently     Partners: Female, Male     Birth control/protection: None   Other Topics Concern    Not on file   Social  History Narrative    Not on file     Social Drivers of Health     Financial Resource Strain: Not on file   Food Insecurity: Not on file   Transportation Needs: Not on file   Physical Activity: Not on file   Stress: Not on file   Social Connections: Not on file   Intimate Partner Violence: Not on file   Housing Stability: Not on file       Past Surgical History:   Procedure Laterality Date    BACK SURGERY  2018    JOINT REPLACEMENT  07/26/2023    OTHER SURGICAL HISTORY  10/26/2021    Carpal tunnel surgery    OTHER SURGICAL HISTORY  10/26/2021    Foot surgery    OTHER SURGICAL HISTORY  10/26/2021    Complete colonoscopy    OTHER SURGICAL HISTORY  02/28/2022    Subtotal thyroidectomy    OTHER SURGICAL HISTORY  09/09/2020    Back surgery    OTHER SURGICAL HISTORY  09/09/2020    Cyst excision    OTHER SURGICAL HISTORY  09/09/2020    Colonoscopy    OTHER SURGICAL HISTORY  09/09/2020    [place] tooth extraction    SPINE SURGERY  2015    TOTAL KNEE ARTHROPLASTY Right        Scribe Attestation  By signing my name below, IArely Scribe   attest that this documentation has been prepared under the direction and in the presence of David Ramon MD.     Scribe Attestation  By signing my name below, IMikayla Scribe  attest that this documentation has been prepared under the direction and in the presence of David Ramon MD.

## 2025-05-13 ENCOUNTER — OFFICE VISIT (OUTPATIENT)
Dept: ORTHOPEDIC SURGERY | Facility: CLINIC | Age: 80
End: 2025-05-13
Payer: MEDICARE

## 2025-05-13 DIAGNOSIS — M17.11 PRIMARY OSTEOARTHRITIS OF RIGHT KNEE: ICD-10-CM

## 2025-05-13 PROCEDURE — 99214 OFFICE O/P EST MOD 30 MIN: CPT | Performed by: ORTHOPAEDIC SURGERY

## 2025-05-13 PROCEDURE — 99212 OFFICE O/P EST SF 10 MIN: CPT | Performed by: ORTHOPAEDIC SURGERY

## 2025-05-13 RX ORDER — NAPROXEN 500 MG/1
500 TABLET ORAL 2 TIMES DAILY
Qty: 60 TABLET | Refills: 0 | Status: SHIPPED | OUTPATIENT
Start: 2025-05-13 | End: 2025-06-12

## 2025-05-13 RX ORDER — METHYLPREDNISOLONE 4 MG/1
TABLET ORAL
Qty: 21 TABLET | Refills: 0 | Status: SHIPPED | OUTPATIENT
Start: 2025-05-13

## 2025-05-19 ENCOUNTER — APPOINTMENT (OUTPATIENT)
Dept: PRIMARY CARE | Facility: CLINIC | Age: 80
End: 2025-05-19
Payer: MEDICARE

## 2025-05-19 VITALS
BODY MASS INDEX: 34.36 KG/M2 | WEIGHT: 182 LBS | HEIGHT: 61 IN | RESPIRATION RATE: 18 BRPM | TEMPERATURE: 96.3 F | OXYGEN SATURATION: 97 % | HEART RATE: 64 BPM

## 2025-05-19 DIAGNOSIS — Z71.2 ENCOUNTER TO DISCUSS TEST RESULTS: ICD-10-CM

## 2025-05-19 DIAGNOSIS — J43.2 CENTRILOBULAR EMPHYSEMA (MULTI): ICD-10-CM

## 2025-05-19 DIAGNOSIS — Z87.891 FORMER SMOKER: ICD-10-CM

## 2025-05-19 DIAGNOSIS — J43.8 OTHER EMPHYSEMA (MULTI): ICD-10-CM

## 2025-05-19 DIAGNOSIS — Z00.00 MEDICARE ANNUAL WELLNESS VISIT, SUBSEQUENT: Primary | ICD-10-CM

## 2025-05-19 DIAGNOSIS — E11.65 TYPE 2 DIABETES MELLITUS WITH HYPERGLYCEMIA, WITHOUT LONG-TERM CURRENT USE OF INSULIN: ICD-10-CM

## 2025-05-19 PROCEDURE — 1159F MED LIST DOCD IN RCRD: CPT | Performed by: FAMILY MEDICINE

## 2025-05-19 PROCEDURE — G0444 DEPRESSION SCREEN ANNUAL: HCPCS | Performed by: FAMILY MEDICINE

## 2025-05-19 PROCEDURE — G0439 PPPS, SUBSEQ VISIT: HCPCS | Performed by: FAMILY MEDICINE

## 2025-05-19 PROCEDURE — 3288F FALL RISK ASSESSMENT DOCD: CPT | Performed by: FAMILY MEDICINE

## 2025-05-19 PROCEDURE — 1170F FXNL STATUS ASSESSED: CPT | Performed by: FAMILY MEDICINE

## 2025-05-19 ASSESSMENT — ACTIVITIES OF DAILY LIVING (ADL)
MANAGING_FINANCES: INDEPENDENT
GROCERY_SHOPPING: INDEPENDENT
BATHING: INDEPENDENT
TAKING_MEDICATION: INDEPENDENT
DOING_HOUSEWORK: INDEPENDENT
DRESSING: INDEPENDENT

## 2025-05-19 ASSESSMENT — PATIENT HEALTH QUESTIONNAIRE - PHQ9
SUM OF ALL RESPONSES TO PHQ9 QUESTIONS 1 AND 2: 0
1. LITTLE INTEREST OR PLEASURE IN DOING THINGS: NOT AT ALL
2. FEELING DOWN, DEPRESSED OR HOPELESS: NOT AT ALL

## 2025-05-19 NOTE — PROGRESS NOTES
"Subjective   Chief complaint: Lien Begum \"Trang" is a 79 y.o. female who presents for Medicare Annual Wellness Visit Subsequent.    HPI:  Here for an annual wellness visit.  Also had labs done and would like to review them together.  Medications:  no  Diet:  discussed healthy guidelines  Exercise:  discussed healthy guidelines  Sleep:  not great.  Wakes frequently.  Usually sleeps better during the day.  Usually returns to sleep.   Mood:  Usually ok, no depression, no suicidal ideation, has a good support network.  Denies feeling stressed. Depression screening completed using the PHQ-2 screening tool.  See rooming flow sheet for documentation.  5 minutes spent.  Ophthalmology:  due for an appointment  Dental:  up to date  Advanced Directives:  has a living will and POA  Falls:  none, some balance concerns,  currently in physical therapy.    REVIEW OF SYSTEMS:  Constitutional:  No fever nor chills.  No significant weight changes.    Eyes: No vision changes.  Sclerae clear.  ENT:  No hearing changes.  No nasal congestion.    Cardiovascular:  No chest pains, palpitations, or dyspnea on exertion.  Respiratory:  No cough or shortness of breath.  No wheezing.   GI:  No bowel habit changes. No nausea or vomiting.    MS:  No muscle pains.  Has knee pain - the one that was replaced.  Has seen ortho.  Having PT again.  Lymphatics:  No swelling.  CNS:  No weakness.  No numbness nor tingling, except in her hands from her C-spine fusion.  No gait change.  Psychiatric: No depression, no anxiety.  Mood is positive and appropriate.            Objective   Pulse 64   Temp 35.7 °C (96.3 °F) (Temporal)   Resp 18   Ht 1.549 m (5' 1\")   Wt 82.6 kg (182 lb)   SpO2 97%   BMI 34.39 kg/m²   Physical Exam  General:  Alert, oriented, no acute distress  Eyes:  Sclerae white, PER, conjunctivae clear  ENT:  No nasal congestion.    Neck: Supple  Respiratory:  Normal breath sounds.  No wheezing, rhonchi nor crackles.  No " dyspnea.  Cardiovascular:  S1 and S2 positive.  Regular rate and rhythm.  No gallops.  No murmurs.  Vascular:  No edema.  Skin warm and dry.  CNS:  No gross neurological deficits.  Gait within normal limits.    Psychiatric:  Affect is positive and appropriate.  No depression.  No anxiety.    Review of Systems   I have reviewed and reconciled the medication list with the patient today. Current Medications[1]     Imaging:  Imaging  No results found.    Cardiology, Vascular, and Other Imaging  No other imaging results found for the past 7 days       Labs reviewed:    Lab Results   Component Value Date    WBC 6.7 04/16/2025    HGB 12.2 04/16/2025    HCT 37.6 04/16/2025     04/16/2025    CHOL 166 04/16/2025    TRIG 121 04/16/2025    HDL 57 04/16/2025    ALT 13 04/16/2025    AST 18 04/16/2025     04/16/2025    K 4.7 04/16/2025     04/16/2025    CREATININE 1.04 (H) 04/16/2025    BUN 33 (H) 04/16/2025    CO2 24 04/16/2025    TSH 2.84 04/16/2025    INR 1.0 07/10/2023    HGBA1C 5.9 (H) 04/16/2025       Assessment/Plan   Problem List Items Addressed This Visit       Centrilobular emphysema (Multi)    Patient is stable.  Screening low-dose lung CT to be rechecked in December 2025.         Encounter to discuss test results    Reviewed lab/testing results with the patient and provided patient education regarding the results.           Medicare annual wellness visit, subsequent - Primary    Age appropriate preventive measures reviewed and discussed.  Diet and exercise recommendations discussed.             Other emphysema (Multi)    Patient is stable.         Type 2 diabetes mellitus with hyperglycemia, without long-term current use of insulin    Order for hemoglobin A1c given.         Relevant Orders    Basic Metabolic Panel    Hemoglobin A1C     Other Visit Diagnoses         Former smoker        Relevant Orders    CT lung screening low dose            Continue current medications as listed  Follow up in one  year for next wellness visit.            [1]   Current Outpatient Medications:     atorvastatin (Lipitor) 10 mg tablet, TAKE 1 TABLET BY MOUTH ONCE  DAILY AT BEDTIME, Disp: 90 tablet, Rfl: 3    calcium-magnesium-zinc 333-133-5 mg tablet, Take 1 tablet by mouth once daily., Disp: , Rfl:     levothyroxine (Synthroid, Levoxyl) 50 mcg tablet, TAKE 1 TABLET BY MOUTH ONCE  DAILY, Disp: 90 tablet, Rfl: 3    loratadine (Claritin Liqui-Gel) 10 mg capsule, Take 1 capsule by mouth once daily., Disp: , Rfl:     methylPREDNISolone (Medrol Dospak) 4 mg tablets, Use as directed by package instructions, Disp: 21 tablet, Rfl: 0    mirabegron (Myrbetriq) 50 mg tablet extended release 24 hr 24 hr tablet, Take 1 tablet (50 mg) by mouth once daily., Disp: 90 tablet, Rfl: 1    multivit-min/ferrous fumarate (MULTI VITAMIN ORAL), Take 1 tablet by mouth once daily., Disp: , Rfl:     naproxen (Naprosyn) 500 mg tablet, Take 1 tablet (500 mg) by mouth 2 times a day., Disp: 60 tablet, Rfl: 0

## 2025-05-20 PROBLEM — J43.8 OTHER EMPHYSEMA (MULTI): Status: ACTIVE | Noted: 2025-05-20

## 2025-05-20 PROBLEM — E11.65 TYPE 2 DIABETES MELLITUS WITH HYPERGLYCEMIA, WITHOUT LONG-TERM CURRENT USE OF INSULIN: Status: ACTIVE | Noted: 2025-05-20

## 2025-05-20 PROBLEM — J43.2 CENTRILOBULAR EMPHYSEMA (MULTI): Status: ACTIVE | Noted: 2025-05-20

## 2025-06-09 DIAGNOSIS — M17.11 PRIMARY OSTEOARTHRITIS OF RIGHT KNEE: ICD-10-CM

## 2025-06-10 RX ORDER — NAPROXEN 500 MG/1
500 TABLET ORAL 2 TIMES DAILY
Qty: 60 TABLET | Refills: 0 | Status: SHIPPED | OUTPATIENT
Start: 2025-06-10

## 2025-06-23 NOTE — PROGRESS NOTES
Chief Complaint   Patient presents with    Right Knee - Follow-up     S/p TKA 7/26/23  Xr 4/4/25       History of Present Illness:  DOS: 07/26/23 right total knee arthroplasty. She reports some pain and soreness around the knee that radiates up. She did feel that her patellar pain was improving with the oral steroid and naproxen. She then had to stop taking naproxen as she was experiencing some GI upset. The pain has since returned and is located mostly over the patella. Her knee did not bother her until January. She thought she was making some progress with therapy but is unsure. She feels that everything she has done is temporary but the pain always returns.     Imaging:  X-rays right knee: Well aligned total knee replacement.  No evidence of loosening in the patella.      Assessment:  Status post right total knee replacement, patellar tendinitis     Plan:  Medrol Dospak  Voltaren gel  Ice/heat: 60 minutes consistently  Knee brace  Home exercise handout given today.   At this point, I want to get an MRI of the right knee to further delineate the nature and location of her underlying complaints. We will see her back afterwards to discuss the results.     Physical Exam:  General appearance well-nourished well-developed in no x3 no acute distress  Right knee:  Flexion to 120  5/5 quad strength  She has point tenderness at the quad.  Pain on inferior pole of the patella    Review of Systems:  GENERAL: Negative for malaise, significant weight loss, fever  MUSCULOSKELETAL: see HPI  NEURO:  Negative    Past Medical History:   Diagnosis Date    Arthritis 2007    Disease of thyroid gland 2014    Other specified health status     No pertinent past medical history    Personal history of other endocrine, nutritional and metabolic disease     History of diabetes mellitus    Personal history of other specified conditions     History of chest pain    Personal history of other specified conditions     History of balance  disorder    Personal history of urinary (tract) infections 03/28/2022    History of cystitis    Personal history of urinary (tract) infections 05/16/2022    History of urinary tract infection    Unspecified osteoarthritis, unspecified site     Osteoarthritis    Urinary tract infection 2020    Varicella 1954       Medication Documentation Review Audit       Reviewed by Karlene Coello MA (Medical Assistant) on 05/19/25 at 1051      Medication Order Taking? Sig Documenting Provider Last Dose Status   atorvastatin (Lipitor) 10 mg tablet [id number] Yes TAKE 1 TABLET BY MOUTH ONCE  DAILY AT BEDTIME Nadira Keller MD  Active   calcium-magnesium-zinc 333-133-5 mg tablet [id number] Yes Take 1 tablet by mouth once daily. Historical Provider, MD Taking Active   ibuprofen 800 mg tablet [id number]  Take 1 tablet (800 mg) by mouth every 8 hours if needed for mild pain (1 - 3). Marti Blackman PA-C  Active   levothyroxine (Synthroid, Levoxyl) 50 mcg tablet [id number] Yes TAKE 1 TABLET BY MOUTH ONCE  DAILY Nadira Keller MD  Active   loratadine (Claritin Liqui-Gel) 10 mg capsule [id number] Yes Take 1 capsule by mouth once daily. Historical Provider, MD Taking Active   methylPREDNISolone (Medrol Dospak) 4 mg tablets [id number]  Use as directed by package instructions Marti Blackman PA-C  Active   methylPREDNISolone (Medrol Dospak) 4 mg tablets [id number] Yes Use as directed by package instructions David Ramon MD  Active   mirabegron (Myrbetriq) 50 mg tablet extended release 24 hr 24 hr tablet [id number] Yes Take 1 tablet (50 mg) by mouth once daily. Nadira Keller MD  Active   multivit-min/ferrous fumarate (MULTI VITAMIN ORAL) [id number] Yes Take 1 tablet by mouth once daily. Historical Provider, MD Taking Active   naproxen (Naprosyn) 500 mg tablet [id number] Yes Take 1 tablet (500 mg) by mouth 2 times a day. David Ramon MD  Active                    Allergies   Allergen Reactions    Simvastatin  Unknown    Sulfamethoxazole Other     Muscle pain and weakness        Social History     Socioeconomic History    Marital status: Single     Spouse name: Not on file    Number of children: Not on file    Years of education: Not on file    Highest education level: Not on file   Occupational History    Not on file   Tobacco Use    Smoking status: Former     Current packs/day: 0.00     Average packs/day: 1 pack/day for 56.5 years (56.5 ttl pk-yrs)     Types: Cigarettes     Start date: 6/24/1965     Quit date: 2022     Years since quitting: 3.4    Smokeless tobacco: Never   Vaping Use    Vaping status: Never Used   Substance and Sexual Activity    Alcohol use: Yes     Alcohol/week: 6.0 standard drinks of alcohol     Comment: 5 a week    Drug use: Never    Sexual activity: Not Currently     Partners: Female, Male     Birth control/protection: Post-menopausal   Other Topics Concern    Not on file   Social History Narrative    Not on file     Social Drivers of Health     Financial Resource Strain: Not on file   Food Insecurity: Not on file   Transportation Needs: Not on file   Physical Activity: Not on file   Stress: Not on file   Social Connections: Not on file   Intimate Partner Violence: Not on file   Housing Stability: Not on file       Past Surgical History:   Procedure Laterality Date    BACK SURGERY  2018    JOINT REPLACEMENT  07/26/2023    OTHER SURGICAL HISTORY  10/26/2021    Carpal tunnel surgery    OTHER SURGICAL HISTORY  10/26/2021    Foot surgery    OTHER SURGICAL HISTORY  10/26/2021    Complete colonoscopy    OTHER SURGICAL HISTORY  02/28/2022    Subtotal thyroidectomy    OTHER SURGICAL HISTORY  09/09/2020    Back surgery    OTHER SURGICAL HISTORY  09/09/2020    Cyst excision    OTHER SURGICAL HISTORY  09/09/2020    Colonoscopy    OTHER SURGICAL HISTORY  09/09/2020    [place] tooth extraction    SPINE SURGERY  2015    TOTAL KNEE ARTHROPLASTY Right      Scribe Attestation:  By signing my name below, Arely PATHAK  Paco Jarrell attest that this documentation has been prepared under the direction and in the presence of David Ramon MD.    Provider Attestation - Scribe documentation:  All medical record entries made by Arely Jarrell were at my direction and personally dictated by me, David Ramon MD. I have reviewed the chart and agree that the record is accurate and I confirmed that it reflects my personal performance of the history, physical exam, discussion, and plan.

## 2025-06-24 ENCOUNTER — OFFICE VISIT (OUTPATIENT)
Dept: ORTHOPEDIC SURGERY | Facility: CLINIC | Age: 80
End: 2025-06-24
Payer: MEDICARE

## 2025-06-24 DIAGNOSIS — Z96.651 STATUS POST TOTAL RIGHT KNEE REPLACEMENT: Primary | ICD-10-CM

## 2025-06-24 PROCEDURE — 99212 OFFICE O/P EST SF 10 MIN: CPT | Performed by: ORTHOPAEDIC SURGERY

## 2025-06-24 PROCEDURE — 99214 OFFICE O/P EST MOD 30 MIN: CPT | Performed by: ORTHOPAEDIC SURGERY

## 2025-06-24 RX ORDER — METHYLPREDNISOLONE 4 MG/1
TABLET ORAL
Qty: 21 TABLET | Refills: 0 | Status: SHIPPED | OUTPATIENT
Start: 2025-06-24

## 2025-06-24 RX ORDER — DICLOFENAC SODIUM 10 MG/G
GEL TOPICAL
Qty: 150 G | Refills: 2 | Status: SHIPPED | OUTPATIENT
Start: 2025-06-24

## 2025-07-09 ENCOUNTER — PATIENT MESSAGE (OUTPATIENT)
Dept: PRIMARY CARE | Facility: CLINIC | Age: 80
End: 2025-07-09
Payer: MEDICARE

## 2025-07-19 ENCOUNTER — APPOINTMENT (OUTPATIENT)
Dept: RADIOLOGY | Facility: HOSPITAL | Age: 80
End: 2025-07-19
Payer: MEDICARE

## 2025-07-19 DIAGNOSIS — Z96.651 STATUS POST TOTAL RIGHT KNEE REPLACEMENT: ICD-10-CM

## 2025-07-19 PROCEDURE — 73721 MRI JNT OF LWR EXTRE W/O DYE: CPT | Mod: RIGHT SIDE | Performed by: STUDENT IN AN ORGANIZED HEALTH CARE EDUCATION/TRAINING PROGRAM

## 2025-07-19 PROCEDURE — 73721 MRI JNT OF LWR EXTRE W/O DYE: CPT | Mod: RT

## 2025-07-23 NOTE — PROGRESS NOTES
Chief Complaint   Patient presents with    Right Knee - Results     MRI review     History of Present Illness:  DOS: 07/26/23 right total knee arthroplasty. She is here today to review the results of her recent MRI of the right knee. She feels that it is not getting any better. She has been putting ice on it. She did get some relief with oral steroids.     Imaging:  X-rays right knee: Well aligned total knee replacement.  No evidence of loosening in the patella.   MRI right knee: Shows no obvious tear in the quad or patellar tendon. Total knee is intact.       Assessment:  Patellar tendonitis status post right total knee replacement, doing well     Plan:  Medrol Dosepak  Continue with activities and home exercise as tolerated.  Ice, brace, and Voltaren gel as needed.   Follow-up in 2 months.     Physical Exam:  General appearance well-nourished well-developed in no x3 no acute distress  Right knee:  Trace effusion  Flexion to 120  5/5 quad strength  She has point tenderness at the quad.  Pain on inferior pole of the patella    Review of Systems:  GENERAL: Negative for malaise, significant weight loss, fever  MUSCULOSKELETAL: see HPI  NEURO:  Negative    Past Medical History:   Diagnosis Date    Arthritis 2007    Disease of thyroid gland 2014    Other specified health status     No pertinent past medical history    Personal history of other endocrine, nutritional and metabolic disease     History of diabetes mellitus    Personal history of other specified conditions     History of chest pain    Personal history of other specified conditions     History of balance disorder    Personal history of urinary (tract) infections 03/28/2022    History of cystitis    Personal history of urinary (tract) infections 05/16/2022    History of urinary tract infection    Unspecified osteoarthritis, unspecified site     Osteoarthritis    Urinary tract infection 2020    Varicella 1954       Medication Documentation Review Audit        Reviewed by Karlene Coello MA (Medical Assistant) on 05/19/25 at 1051      Medication Order Taking? Sig Documenting Provider Last Dose Status   atorvastatin (Lipitor) 10 mg tablet [id number] Yes TAKE 1 TABLET BY MOUTH ONCE  DAILY AT BEDTIME Nadira Keller MD  Active   calcium-magnesium-zinc 333-133-5 mg tablet [id number] Yes Take 1 tablet by mouth once daily. Historical Provider, MD Taking Active   ibuprofen 800 mg tablet [id number]  Take 1 tablet (800 mg) by mouth every 8 hours if needed for mild pain (1 - 3). Marti Blackman PA-C  Active   levothyroxine (Synthroid, Levoxyl) 50 mcg tablet [id number] Yes TAKE 1 TABLET BY MOUTH ONCE  DAILY Nadira Keller MD  Active   loratadine (Claritin Liqui-Gel) 10 mg capsule [id number] Yes Take 1 capsule by mouth once daily. Historical Provider, MD Taking Active   methylPREDNISolone (Medrol Dospak) 4 mg tablets [id number]  Use as directed by package instructions Marti Blackman PA-C  Active   methylPREDNISolone (Medrol Dospak) 4 mg tablets [id number] Yes Use as directed by package instructions David Ramon MD  Active   mirabegron (Myrbetriq) 50 mg tablet extended release 24 hr 24 hr tablet [id number] Yes Take 1 tablet (50 mg) by mouth once daily. Nadira Keller MD  Active   multivit-min/ferrous fumarate (MULTI VITAMIN ORAL) [id number] Yes Take 1 tablet by mouth once daily. Historical Provider, MD Taking Active   naproxen (Naprosyn) 500 mg tablet [id number] Yes Take 1 tablet (500 mg) by mouth 2 times a day. David Ramon MD  Active                    Allergies   Allergen Reactions    Simvastatin Unknown    Sulfamethoxazole Other     Muscle pain and weakness        Social History     Socioeconomic History    Marital status: Single     Spouse name: Not on file    Number of children: Not on file    Years of education: Not on file    Highest education level: Not on file   Occupational History    Not on file   Tobacco Use    Smoking status: Former      Current packs/day: 0.00     Average packs/day: 1 pack/day for 56.5 years (56.5 ttl pk-yrs)     Types: Cigarettes     Start date: 6/24/1965     Quit date: 2022     Years since quitting: 3.5    Smokeless tobacco: Never   Vaping Use    Vaping status: Never Used   Substance and Sexual Activity    Alcohol use: Yes     Alcohol/week: 6.0 standard drinks of alcohol     Comment: 5 a week    Drug use: Never    Sexual activity: Not Currently     Partners: Female, Male     Birth control/protection: Post-menopausal   Other Topics Concern    Not on file   Social History Narrative    Not on file     Social Drivers of Health     Financial Resource Strain: Not on file   Food Insecurity: Not on file   Transportation Needs: Not on file   Physical Activity: Not on file   Stress: Not on file   Social Connections: Not on file   Intimate Partner Violence: Not on file   Housing Stability: Not on file       Past Surgical History:   Procedure Laterality Date    BACK SURGERY  2018    CARPAL TUNNEL RELEASE  2019    JOINT REPLACEMENT  07/26/2023    KNEE ARTHROPLASTY  2023    OTHER SURGICAL HISTORY  10/26/2021    Carpal tunnel surgery    OTHER SURGICAL HISTORY  10/26/2021    Foot surgery    OTHER SURGICAL HISTORY  10/26/2021    Complete colonoscopy    OTHER SURGICAL HISTORY  02/28/2022    Subtotal thyroidectomy    OTHER SURGICAL HISTORY  09/09/2020    Back surgery    OTHER SURGICAL HISTORY  09/09/2020    Cyst excision    OTHER SURGICAL HISTORY  09/09/2020    Colonoscopy    OTHER SURGICAL HISTORY  09/09/2020    [place] tooth extraction    SPINE SURGERY  2015    TOTAL KNEE ARTHROPLASTY Right      Scribe Attestation:  By signing my name below, I, Paco Cohen attest that this documentation has been prepared under the direction and in the presence of David Ramon MD.    Provider Attestation - Scribe documentation:  All medical record entries made by Arely Jarrell were at my direction and personally dictated by me, David Ramon MD. I have  reviewed the chart and agree that the record is accurate and I confirmed that it reflects my personal performance of the history, physical exam, discussion, and plan.

## 2025-07-24 ENCOUNTER — OFFICE VISIT (OUTPATIENT)
Dept: ORTHOPEDIC SURGERY | Facility: CLINIC | Age: 80
End: 2025-07-24
Payer: MEDICARE

## 2025-07-24 DIAGNOSIS — M76.51 PATELLAR TENDONITIS OF RIGHT KNEE: ICD-10-CM

## 2025-07-24 DIAGNOSIS — Z96.651 STATUS POST TOTAL RIGHT KNEE REPLACEMENT: Primary | ICD-10-CM

## 2025-07-24 PROCEDURE — 99214 OFFICE O/P EST MOD 30 MIN: CPT | Performed by: ORTHOPAEDIC SURGERY

## 2025-07-24 RX ORDER — METHYLPREDNISOLONE 4 MG/1
TABLET ORAL
Qty: 21 TABLET | Refills: 0 | Status: SHIPPED | OUTPATIENT
Start: 2025-07-24

## 2025-09-01 DIAGNOSIS — N32.81 OAB (OVERACTIVE BLADDER): ICD-10-CM

## 2025-09-04 RX ORDER — MIRABEGRON 50 MG/1
50 TABLET, FILM COATED, EXTENDED RELEASE ORAL DAILY
Qty: 90 TABLET | Refills: 3 | Status: SHIPPED | OUTPATIENT
Start: 2025-09-04

## 2025-09-16 ENCOUNTER — APPOINTMENT (OUTPATIENT)
Dept: PRIMARY CARE | Facility: CLINIC | Age: 80
End: 2025-09-16
Payer: MEDICARE

## 2025-11-19 ENCOUNTER — APPOINTMENT (OUTPATIENT)
Dept: PRIMARY CARE | Facility: CLINIC | Age: 80
End: 2025-11-19
Payer: MEDICARE